# Patient Record
Sex: FEMALE | Race: OTHER | Employment: FULL TIME | URBAN - METROPOLITAN AREA
[De-identification: names, ages, dates, MRNs, and addresses within clinical notes are randomized per-mention and may not be internally consistent; named-entity substitution may affect disease eponyms.]

---

## 2018-01-24 ENCOUNTER — OFFICE VISIT (OUTPATIENT)
Dept: OBGYN CLINIC | Facility: CLINIC | Age: 28
End: 2018-01-24

## 2018-01-24 VITALS
BODY MASS INDEX: 31.81 KG/M2 | DIASTOLIC BLOOD PRESSURE: 70 MMHG | HEIGHT: 62 IN | WEIGHT: 172.88 LBS | SYSTOLIC BLOOD PRESSURE: 116 MMHG

## 2018-01-24 DIAGNOSIS — Z32.02 PREGNANCY EXAMINATION OR TEST, NEGATIVE RESULT: Primary | ICD-10-CM

## 2018-01-24 DIAGNOSIS — Z12.4 ROUTINE CERVICAL SMEAR: ICD-10-CM

## 2018-01-24 DIAGNOSIS — N92.6 IRREGULAR PERIODS: ICD-10-CM

## 2018-01-24 LAB
CONTROL LINE PRESENT WITH A CLEAR BACKGROUND (YES/NO): YES YES/NO
PREGNANCY TEST, URINE: NEGATIVE

## 2018-01-24 PROCEDURE — 87591 N.GONORRHOEAE DNA AMP PROB: CPT | Performed by: OBSTETRICS & GYNECOLOGY

## 2018-01-24 PROCEDURE — 87491 CHLMYD TRACH DNA AMP PROBE: CPT | Performed by: OBSTETRICS & GYNECOLOGY

## 2018-01-24 PROCEDURE — 88175 CYTOPATH C/V AUTO FLUID REDO: CPT | Performed by: OBSTETRICS & GYNECOLOGY

## 2018-01-24 PROCEDURE — 81025 URINE PREGNANCY TEST: CPT | Performed by: OBSTETRICS & GYNECOLOGY

## 2018-01-24 PROCEDURE — 99385 PREV VISIT NEW AGE 18-39: CPT | Performed by: OBSTETRICS & GYNECOLOGY

## 2018-01-25 ENCOUNTER — APPOINTMENT (OUTPATIENT)
Dept: LAB | Facility: REFERENCE LAB | Age: 28
End: 2018-01-25
Attending: OBSTETRICS & GYNECOLOGY
Payer: COMMERCIAL

## 2018-01-25 DIAGNOSIS — N92.6 IRREGULAR PERIODS: ICD-10-CM

## 2018-01-25 LAB
C TRACH DNA SPEC QL NAA+PROBE: NEGATIVE
ESTRADIOL SERPL-MCNC: 60 PG/ML
FSH SERPL-ACNC: 7.7 MIU/ML
LH SERPL-ACNC: 16.2 MIU/ML
N GONORRHOEA DNA SPEC QL NAA+PROBE: NEGATIVE
PROGEST SERPL-MCNC: 0.4 NG/ML
PROLACTIN SERPL-MCNC: 12.1 NG/ML (ref 1.4–24.2)
TSH SERPL-ACNC: 2.19 UIU/ML (ref 0.45–5.33)

## 2018-01-25 PROCEDURE — 84443 ASSAY THYROID STIM HORMONE: CPT | Performed by: OBSTETRICS & GYNECOLOGY

## 2018-01-25 PROCEDURE — 83001 ASSAY OF GONADOTROPIN (FSH): CPT | Performed by: OBSTETRICS & GYNECOLOGY

## 2018-01-25 PROCEDURE — 83002 ASSAY OF GONADOTROPIN (LH): CPT | Performed by: OBSTETRICS & GYNECOLOGY

## 2018-01-25 PROCEDURE — 84144 ASSAY OF PROGESTERONE: CPT | Performed by: OBSTETRICS & GYNECOLOGY

## 2018-01-25 PROCEDURE — 36415 COLL VENOUS BLD VENIPUNCTURE: CPT | Performed by: OBSTETRICS & GYNECOLOGY

## 2018-01-25 PROCEDURE — 84402 ASSAY OF FREE TESTOSTERONE: CPT | Performed by: OBSTETRICS & GYNECOLOGY

## 2018-01-25 PROCEDURE — 82670 ASSAY OF TOTAL ESTRADIOL: CPT | Performed by: OBSTETRICS & GYNECOLOGY

## 2018-01-25 PROCEDURE — 84403 ASSAY OF TOTAL TESTOSTERONE: CPT | Performed by: OBSTETRICS & GYNECOLOGY

## 2018-01-25 NOTE — PROGRESS NOTES
[address] [place] [address]    2018  7:41 PM    CC:Patient presents to establish care    HPI: Patient is a 24year old  LMP 2017 after discontinuation of long-term Nuvaring here for annual gyn exam. Cycles always irregular  before Nuvaring placed age pain, diarrhea, constipation  : no complaints, denies dysuria, increased urinary frequency  Hematological/lymphatic: no complaints  Breast: denies rashes, skin changes, pain, lumps or discharge   Psychiatric: no complaints  Endocrine:no complaints  Neuro KENRICK          1/24/2018  Monet Cazares MD

## 2018-01-28 LAB
TESTOSTERONE, FREE, S: 1.14 NG/DL
TESTOSTERONE, TOTAL, S: 52 NG/DL

## 2018-01-29 ENCOUNTER — ULTRASOUND ENCOUNTER (OUTPATIENT)
Dept: OBGYN CLINIC | Facility: CLINIC | Age: 28
End: 2018-01-29

## 2018-01-29 ENCOUNTER — OFFICE VISIT (OUTPATIENT)
Dept: OBGYN CLINIC | Facility: CLINIC | Age: 28
End: 2018-01-29

## 2018-01-29 VITALS — BODY MASS INDEX: 31.65 KG/M2 | HEIGHT: 62 IN | WEIGHT: 172 LBS

## 2018-01-29 DIAGNOSIS — E28.2 PCO (POLYCYSTIC OVARIES): Primary | ICD-10-CM

## 2018-01-29 DIAGNOSIS — N92.6 IRREGULAR PERIODS: ICD-10-CM

## 2018-01-29 PROCEDURE — 76856 US EXAM PELVIC COMPLETE: CPT | Performed by: OBSTETRICS & GYNECOLOGY

## 2018-01-29 PROCEDURE — 76830 TRANSVAGINAL US NON-OB: CPT | Performed by: OBSTETRICS & GYNECOLOGY

## 2018-01-29 PROCEDURE — 99213 OFFICE O/P EST LOW 20 MIN: CPT | Performed by: OBSTETRICS & GYNECOLOGY

## 2018-01-29 NOTE — PROGRESS NOTES
Miriam Craft is a 24year old female. HPI:   Patient presents with: Follow - Up: follow up from usn and blood test    Results reviewed = LH>FSH ratio and multifolllicular ovaries consistent with PCO. Desires fertility in next few years.  Discussed ov

## 2018-12-31 RX ORDER — NORGESTREL AND ETHINYL ESTRADIOL 0.3-0.03MG
KIT ORAL
Qty: 28 TABLET | Refills: 0 | Status: SHIPPED | OUTPATIENT
Start: 2018-12-31 | End: 2019-01-27

## 2018-12-31 NOTE — TELEPHONE ENCOUNTER
Last seen 01/29/2018. 28 day refill sent to pharmacy along with message to pharmacist to have the pt call the office to schedule annual exam appt.

## 2019-01-28 RX ORDER — NORGESTREL AND ETHINYL ESTRADIOL 0.3-0.03MG
KIT ORAL
Qty: 28 TABLET | Refills: 0 | Status: SHIPPED | OUTPATIENT
Start: 2019-01-28 | End: 2019-03-06

## 2019-03-06 ENCOUNTER — OFFICE VISIT (OUTPATIENT)
Dept: OBGYN CLINIC | Facility: CLINIC | Age: 29
End: 2019-03-06
Payer: COMMERCIAL

## 2019-03-06 VITALS
BODY MASS INDEX: 30.49 KG/M2 | SYSTOLIC BLOOD PRESSURE: 110 MMHG | WEIGHT: 165.69 LBS | HEIGHT: 62 IN | DIASTOLIC BLOOD PRESSURE: 74 MMHG

## 2019-03-06 DIAGNOSIS — Z30.41 ENCOUNTER FOR SURVEILLANCE OF CONTRACEPTIVE PILLS: ICD-10-CM

## 2019-03-06 DIAGNOSIS — Z01.419 WOMEN'S ANNUAL ROUTINE GYNECOLOGICAL EXAMINATION: Primary | ICD-10-CM

## 2019-03-06 PROCEDURE — 99395 PREV VISIT EST AGE 18-39: CPT | Performed by: OBSTETRICS & GYNECOLOGY

## 2019-03-06 NOTE — PROGRESS NOTES
GYN ANNUAL    3/6/2019  12:12 PM    CC:Patient presents for well woman visit    HPI: Patient is a 34year old  LMP 3/1/2019 here for annual gyn exam and OCP refill. Cycles well-controlled on OCPs. No pelvic pain.  No abnormal vaginal discharge or ble dysuria, increased urinary frequency  Hematological/lymphatic: no complaints  Breast: denies rashes, skin changes, pain, lumps or discharge   Psychiatric: no complaints  Endocrine:no complaints  Neurological: no complaints  Immunological: no complaints  Mu

## 2019-04-10 ENCOUNTER — PATIENT MESSAGE (OUTPATIENT)
Dept: OBGYN CLINIC | Facility: CLINIC | Age: 29
End: 2019-04-10

## 2019-04-10 NOTE — TELEPHONE ENCOUNTER
From: Pauline Richardson  To: Claudio Saavedra MD  Sent: 4/10/2019 11:20 AM CDT  Subject: Non-Urgent Medical Question    Hello,     I've noticed my urine has a strong odor and an increase in my vaginal discharge for the past two weeks.  I had to switch birth

## 2019-04-10 NOTE — PROGRESS NOTES
Pt states she was off her OCP's for a month and just started back on them. LMP was end of March and pt noticed a clear liquid discharge, no odor, no tching, no burning for the last 2 weeks. Pt states her urine is very dark and has a stronger odor.  This

## 2020-02-07 RX ORDER — NORGESTREL AND ETHINYL ESTRADIOL 0.3-0.03MG
KIT ORAL
Qty: 84 TABLET | Refills: 0 | Status: SHIPPED | OUTPATIENT
Start: 2020-02-07 | End: 2020-05-01

## 2020-05-01 RX ORDER — NORGESTREL AND ETHINYL ESTRADIOL 0.3-0.03MG
KIT ORAL
Qty: 84 TABLET | Refills: 1 | Status: SHIPPED | OUTPATIENT
Start: 2020-05-01 | End: 2020-11-04

## 2020-05-01 NOTE — TELEPHONE ENCOUNTER
A refill request was received for:  Requested Prescriptions     Pending Prescriptions Disp Refills   • LOW-OGESTREL 0.3-30 MG-MCG Oral Tab [Pharmacy Med Name: NNORGEST/E ES(LOWOGESTREL)PK28 0.3/30] 84 tablet 3     Sig: TAKE 1 TABLET DAILY [facility]

## 2020-10-16 RX ORDER — NORGESTREL AND ETHINYL ESTRADIOL 0.3-0.03MG
KIT ORAL
Qty: 84 TABLET | Refills: 3 | OUTPATIENT
Start: 2020-10-16

## 2020-10-16 NOTE — TELEPHONE ENCOUNTER
A refill request was received for:  Requested Prescriptions     Pending Prescriptions Disp Refills   • LOW-OGESTREL 0.3-30 MG-MCG Oral Tab [Pharmacy Med Name: NORGEST/ETH ES(LOW-OGESTREL)TAB 28S 0.3MG/30MCG] 84 tablet 3     Sig: TAKE 1 TABLET DAILY (TIME F

## 2020-11-04 ENCOUNTER — OFFICE VISIT (OUTPATIENT)
Dept: OBGYN CLINIC | Facility: CLINIC | Age: 30
End: 2020-11-04
Payer: COMMERCIAL

## 2020-11-04 VITALS
BODY MASS INDEX: 33.33 KG/M2 | WEIGHT: 181.13 LBS | DIASTOLIC BLOOD PRESSURE: 70 MMHG | HEIGHT: 62 IN | SYSTOLIC BLOOD PRESSURE: 106 MMHG

## 2020-11-04 DIAGNOSIS — Z12.4 ROUTINE CERVICAL SMEAR: ICD-10-CM

## 2020-11-04 DIAGNOSIS — Z01.419 WOMEN'S ANNUAL ROUTINE GYNECOLOGICAL EXAMINATION: Primary | ICD-10-CM

## 2020-11-04 DIAGNOSIS — Z30.41 ENCOUNTER FOR SURVEILLANCE OF CONTRACEPTIVE PILLS: ICD-10-CM

## 2020-11-04 PROCEDURE — 3074F SYST BP LT 130 MM HG: CPT | Performed by: OBSTETRICS & GYNECOLOGY

## 2020-11-04 PROCEDURE — 3078F DIAST BP <80 MM HG: CPT | Performed by: OBSTETRICS & GYNECOLOGY

## 2020-11-04 PROCEDURE — 3008F BODY MASS INDEX DOCD: CPT | Performed by: OBSTETRICS & GYNECOLOGY

## 2020-11-04 PROCEDURE — 99072 ADDL SUPL MATRL&STAF TM PHE: CPT | Performed by: OBSTETRICS & GYNECOLOGY

## 2020-11-04 PROCEDURE — 88175 CYTOPATH C/V AUTO FLUID REDO: CPT | Performed by: OBSTETRICS & GYNECOLOGY

## 2020-11-04 PROCEDURE — 99395 PREV VISIT EST AGE 18-39: CPT | Performed by: OBSTETRICS & GYNECOLOGY

## 2020-11-04 RX ORDER — NORGESTREL AND ETHINYL ESTRADIOL 0.3-0.03MG
1 KIT ORAL DAILY
Qty: 84 TABLET | Refills: 3 | Status: SHIPPED | OUTPATIENT
Start: 2020-11-04 | End: 2021-11-16

## 2020-11-05 NOTE — PROGRESS NOTES
GYN ANNUAL    2020  6:03 PM    Patient presents with: Annual: Annual exam and pap smear   Refill Request: LOW-OGESTREL 0.3-30 MG-MCG Oral   .    HPI: Patient is a 34year old  LMP 10/9/20 presents for annual gyn exam and OCP refill.  Cycles wel complaints  Cardiovascular: no complaints  GI: denies abdominal pain, diarrhea, constipation  : no complaints, denies dysuria, increased urinary frequency  Hematological/lymphatic: no complaints  Breast: denies rashes, skin changes, pain, lumps or discha

## 2021-04-09 DIAGNOSIS — Z23 NEED FOR VACCINATION: ICD-10-CM

## 2021-10-12 NOTE — TELEPHONE ENCOUNTER
[facility]  MED CENTER regarding making an appointment for annual physical

## 2021-11-16 ENCOUNTER — OFFICE VISIT (OUTPATIENT)
Dept: OBGYN CLINIC | Facility: CLINIC | Age: 31
End: 2021-11-16
Payer: COMMERCIAL

## 2021-11-16 VITALS
DIASTOLIC BLOOD PRESSURE: 82 MMHG | WEIGHT: 183.31 LBS | SYSTOLIC BLOOD PRESSURE: 110 MMHG | HEIGHT: 62 IN | BODY MASS INDEX: 33.73 KG/M2

## 2021-11-16 DIAGNOSIS — Z01.419 WOMEN'S ANNUAL ROUTINE GYNECOLOGICAL EXAMINATION: Primary | ICD-10-CM

## 2021-11-16 DIAGNOSIS — Z30.41 ENCOUNTER FOR SURVEILLANCE OF CONTRACEPTIVE PILLS: ICD-10-CM

## 2021-11-16 PROCEDURE — 3008F BODY MASS INDEX DOCD: CPT | Performed by: OBSTETRICS & GYNECOLOGY

## 2021-11-16 PROCEDURE — 99395 PREV VISIT EST AGE 18-39: CPT | Performed by: OBSTETRICS & GYNECOLOGY

## 2021-11-16 PROCEDURE — 3074F SYST BP LT 130 MM HG: CPT | Performed by: OBSTETRICS & GYNECOLOGY

## 2021-11-16 PROCEDURE — 3079F DIAST BP 80-89 MM HG: CPT | Performed by: OBSTETRICS & GYNECOLOGY

## 2021-11-16 PROCEDURE — 87591 N.GONORRHOEAE DNA AMP PROB: CPT | Performed by: OBSTETRICS & GYNECOLOGY

## 2021-11-16 PROCEDURE — 87491 CHLMYD TRACH DNA AMP PROBE: CPT | Performed by: OBSTETRICS & GYNECOLOGY

## 2021-11-16 RX ORDER — NORGESTREL AND ETHINYL ESTRADIOL 0.3-0.03MG
1 KIT ORAL DAILY
Qty: 84 TABLET | Refills: 3 | Status: SHIPPED | OUTPATIENT
Start: 2021-11-16

## 2021-11-16 NOTE — PROGRESS NOTES
GYN ANNUAL    2021  3:29 PM    Patient presents with: Annual: Annual Gyne Exam  .    HPI: Patient is a 31year old  LMP 10/20/21 presents for annual gyn exam - due for PAP and OCP refill, requesting STD screen. Cycles well controlled.  Reports developed, well nourished, in no apparent distress  SKIN: no rashes, no lesions  HEENT: normal  LUNGS: respiration unlabored  CARDIOVASCULAR: no peripheral edema or varicosities, skin warm and dry  BREASTS: bilaterally nontender, no palpable masses, no nip

## 2021-12-07 RX ORDER — NORGESTREL AND ETHINYL ESTRADIOL 0.3-0.03MG
KIT ORAL
Qty: 84 TABLET | Refills: 0 | OUTPATIENT
Start: 2021-12-07

## 2022-09-30 RX ORDER — NORGESTREL AND ETHINYL ESTRADIOL 0.3-0.03MG
1 KIT ORAL DAILY
Qty: 84 TABLET | Refills: 0 | Status: SHIPPED | OUTPATIENT
Start: 2022-09-30

## 2024-11-20 ENCOUNTER — INITIAL PRENATAL (OUTPATIENT)
Dept: OBGYN CLINIC | Facility: CLINIC | Age: 34
End: 2024-11-20

## 2024-11-20 VITALS
BODY MASS INDEX: 33.3 KG/M2 | DIASTOLIC BLOOD PRESSURE: 66 MMHG | WEIGHT: 176.38 LBS | SYSTOLIC BLOOD PRESSURE: 114 MMHG | HEIGHT: 61 IN

## 2024-11-20 DIAGNOSIS — Z32.01 PREGNANCY EXAMINATION OR TEST, POSITIVE RESULT (HCC): Primary | ICD-10-CM

## 2024-11-20 DIAGNOSIS — N91.1 SECONDARY AMENORRHEA: ICD-10-CM

## 2024-11-20 PROCEDURE — 99204 OFFICE O/P NEW MOD 45 MIN: CPT | Performed by: OBSTETRICS & GYNECOLOGY

## 2024-11-20 PROCEDURE — 76801 OB US < 14 WKS SINGLE FETUS: CPT | Performed by: OBSTETRICS & GYNECOLOGY

## 2024-11-20 NOTE — H&P
[facility]  Obstetrics and Gynecology    Subjective:     Alisson Gonzalez is a 33 year old  female presents with c/o secondary amenorrhea and positive pregnancy test. The patient was recommended to return for further evaluation. The patient reports normal monthly menses leading up to last menstrual period. Has irregular cramping without bleeding. No nausea or vomiting. Patient's last menstrual period was 2024 (exact date)..     Review of Systems:  General: no complaints  Eyes: no complaints  Respiratory: no complaints  Cardiovascular: no complaints  GI: no complaints  : See HPI  Hematological/lymphatic: no complaints  Breast: no complaints  Psychiatric: no complaints  Endocrine:no complaints  Neurological: no complaints  Immunological: no complaints  Musculoskeletal:no complaints    Past Medical History:    Patient denies medical problems    as of 2018       Past Surgical History:   Procedure Laterality Date    Patient denies any surgical history      as of 2019       Social History     Socioeconomic History    Marital status: Single     Spouse name: Not on file    Number of children: Not on file    Years of education: Not on file    Highest education level: Not on file   Occupational History    Not on file   Tobacco Use    Smoking status: Never    Smokeless tobacco: Never   Substance and Sexual Activity    Alcohol use: Yes    Drug use: No    Sexual activity: Yes     Partners: Male   Other Topics Concern     Service Not Asked    Blood Transfusions No    Caffeine Concern Not Asked    Occupational Exposure Not Asked    Hobby Hazards Not Asked    Sleep Concern Not Asked    Stress Concern Not Asked    Weight Concern Not Asked    Special Diet Not Asked    Back Care Not Asked    Exercise Not Asked    Bike Helmet Not Asked    Seat Belt Not Asked    Self-Exams Not Asked   Social History Narrative    No h/o abuse      Social Drivers of Health     Financial Resource  Strain: Not on file   Food Insecurity: Not on file   Transportation Needs: Not on file   Stress: Not on file (2021)   Housing Stability: Low Risk  (2021)    Received from [facility]    Housing Stability     Mortgage Payment Concerns?: Not on file     Number of Places Lived in the Last Year: Not on file     Unstable Housing?: Not on file       Family History   Problem Relation Age of Onset    No Known Problems Maternal Grandmother     No Known Problems Maternal Grandfather     No Known Problems Paternal Grandmother     No Known Problems Paternal Grandfather        Feels safe at home       Objective:     Vitals:    24 1456   BP: 114/66   Weight: 176 lb 6.4 oz (80 kg)   Height: 61\"         Body mass index is 33.33 kg/m².    Exam:   GENERAL: well developed, well nourished, in no apparent distress, alert and orientated X 3  PSYCH: mood and affect stable   SKIN: no rashes, no lesions  LUNGS: respiration unlabored  CARDIOVASCULAR: no peripheral edema or varicosities, skin warm and dry  ABDOMEN: Soft, non distended; non tender.   EXTREMITIES:  Normal range of motion, strength 5/5 walking, nontender without edema    Imaging:  Pelvic ultrasound 24:   Findings:   Crown-rump length measures 39.3 mm, 10 weeks 6 days.   Fetal heart rate measures 157 beats per minute via doppler-mode.        Assessment:     Alisson Gonzalez is a 33 year old  female who presents for secondary amenorrhea and positive pregnancy test    Patient Active Problem List   Diagnosis    Pregnancy examination or test, negative result    Irregular periods    PCO (polycystic ovaries)    Women's annual routine gynecological examination    Encounter for surveillance of contraceptive pills         Plan:     Secondary amenorrhea  - a/w positive pregnancy test  - US noted for viable IUP at 10w6d consistent with last menstrual period CHITO. Final CHITO is Estimated Date of Delivery: 25.    - RN education visit ASAP.   -  New OB visit in 3-4 weeks.     Patient counseling via instructions   - Pt counseled on safety, diet, exercise, caffiene, tobacco, ETOH, sexual activity, ER precautions, diet, exercise, appropriate otc medication use, substance abuse   - Counseled on taking a PNV with 0.4mg of folic acid    - advised to follow up to establish prenatal care   - SAB precautions reviewed.    - Nausea and vomiting in pregnancy including vitamin B 6 and unisom reviewed in after visit instructions.     All of the findings and plan were discussed with the patient.  She notes understanding and agrees with the plan of care.  All questions were answered to the best of my ability at this time.    RTC as above or sooner if needed     Dr. Ap Kowalski MD    [facility]     This note was created by [employer] voice recognition. Errors in content may be related to improper recognition by the system; efforts to review and correct have been done but errors may still exist. Please be advised the primary purpose of this note is for me to communicate medical care. Standard sentence structure is not always used. Medical terminology and medical abbreviations may be used. There may be grammatical, typographical, and automated fill ins that may have errors missed in proofreading.      Total patient time was 45 minutes in evaluation and management.

## 2024-11-20 NOTE — PATIENT INSTRUCTIONS
Adapting to Pregnancy: First Trimester    As your body adjusts, you may have to change or limit your daily activities. You’ll need more rest. You may also need to use the energy you have more wisely.  Your changing body  Almost every part of your body is affected as you adapt to pregnancy. The uterus and cervix will begin to soften right away. You may not look very pregnant during the first 3 months. But you are likely to have some common signs of early pregnancy:  Nausea  Fatigue  Frequent urination  Mood swings  Bloating of the belly  Constipation  Heartburn  Missed or light periods (first trimester bleeding)  Nipple or breast tenderness and breast swelling  It’s not too late to start good habits  What matters most is protecting your baby from this moment on. If you smoke, drink alcohol, or use drugs, now is the time to stop. If you need help, talk with your healthcare provider:  Smoking increases the risk of stillbirth or having a low-birth-weight baby. If you smoke, quit now.  Alcohol and drugs have been linked with miscarriage, birth defects, intellectual disability, and low birth weight. Do not drink alcohol or take drugs.  Tips to relieve nausea  Although nausea can happen at any time of the day, it may be worse in the morning. To help prevent nausea:  Eat small, light meals at frequent intervals.  Drink fluids often.  Get up slowly. Eat a few unsalted crackers before you get out of bed.  Avoid smells that bother you.  Avoid spicy and fatty foods.  Eat an ice pop in your favorite flavor.  Get plenty of rest.  You can start taking edwardo or mint in all forms or vitamin B6 (maximum 200 mg throughout the day) and Unisom (12.5-25 mg) nightly for nausea and/or vomiting.  Talk with your healthcare provider if you take vitamins that upset your stomach.    Work concerns  The end of the first trimester is a good time to discuss working during pregnancy with your employer. Follow your healthcare provider’s advice if  your job needs you to stand for a long time, work with hazardous tools, or even sit at a desk all day. Your workspace, workload, or scheduled hours may need to be adjusted. Perhaps you can change body postures more often or take an extra break. It is also acceptable to work throughout your pregnancy until delivery.   Advice for travel  Talk to your healthcare provider first, but the second trimester may be the best time for any travel. You may be advised to avoid certain trips while you’re pregnant. Food and water can be concerns in developing countries. Travel by car is a good choice, as you can stop, get out, and stretch. Bring snacks and water along. Fasten the lap belt below your belly, low over your hips. Also be sure to wear the shoulder harness.  Intimacy  Unless your healthcare provider tells you to, there is no reason to stop having sex while you’re pregnant. You or your partner may notice changes in desire. Desire may be less in the first trimester, due to nausea and fatigue. In the second trimester, sex may be very enjoyable. The third trimester can be a challenge comfort-wise. Try different positions and see what’s best for you both.  [employer] last reviewed this educational content on 10/1/2017  © 0039-5053 [employer]. [address] [facility], [place] [address]. All rights reserved. This information is not intended as a substitute for professional medical care. Always follow your healthcare professional's instructions.

## 2024-11-21 ENCOUNTER — MED REC SCAN ONLY (OUTPATIENT)
Dept: OBGYN CLINIC | Facility: CLINIC | Age: 34
End: 2024-11-21

## 2024-12-05 ENCOUNTER — LAB ENCOUNTER (OUTPATIENT)
Dept: LAB | Age: 34
End: 2024-12-05
Attending: OBSTETRICS & GYNECOLOGY
Payer: MEDICAID

## 2024-12-05 ENCOUNTER — NURSE ONLY (OUTPATIENT)
Dept: OBGYN CLINIC | Facility: CLINIC | Age: 34
End: 2024-12-05
Payer: MEDICAID

## 2024-12-05 DIAGNOSIS — Z34.81 ENCOUNTER FOR SUPERVISION OF OTHER NORMAL PREGNANCY IN FIRST TRIMESTER (HCC): ICD-10-CM

## 2024-12-05 DIAGNOSIS — Z34.81 ENCOUNTER FOR SUPERVISION OF OTHER NORMAL PREGNANCY IN FIRST TRIMESTER (HCC): Primary | ICD-10-CM

## 2024-12-05 LAB
ANTIBODY SCREEN: NEGATIVE
BASOPHILS # BLD AUTO: 0.02 X10(3) UL (ref 0–0.2)
BASOPHILS NFR BLD AUTO: 0.2 %
DEPRECATED RDW RBC AUTO: 43.5 FL (ref 35.1–46.3)
EOSINOPHIL # BLD AUTO: 0.12 X10(3) UL (ref 0–0.7)
EOSINOPHIL NFR BLD AUTO: 1.5 %
ERYTHROCYTE [DISTWIDTH] IN BLOOD BY AUTOMATED COUNT: 13.2 % (ref 11–15)
EST. AVERAGE GLUCOSE BLD GHB EST-MCNC: 94 MG/DL (ref 68–126)
GLUCOSE 1H P GLC SERPL-MCNC: 74 MG/DL
HBA1C MFR BLD: 4.9 % (ref ?–5.7)
HBV SURFACE AG SER-ACNC: <0.1 [IU]/L
HBV SURFACE AG SERPL QL IA: NONREACTIVE
HCT VFR BLD AUTO: 33.4 %
HCV AB SERPL QL IA: NONREACTIVE
HGB BLD-MCNC: 12.1 G/DL
IMM GRANULOCYTES # BLD AUTO: 0.03 X10(3) UL (ref 0–1)
IMM GRANULOCYTES NFR BLD: 0.4 %
LYMPHOCYTES # BLD AUTO: 2.05 X10(3) UL (ref 1–4)
LYMPHOCYTES NFR BLD AUTO: 25.6 %
MCH RBC QN AUTO: 32.9 PG (ref 26–34)
MCHC RBC AUTO-ENTMCNC: 36.2 G/DL (ref 31–37)
MCV RBC AUTO: 90.8 FL
MONOCYTES # BLD AUTO: 0.63 X10(3) UL (ref 0.1–1)
MONOCYTES NFR BLD AUTO: 7.9 %
NEUTROPHILS # BLD AUTO: 5.16 X10 (3) UL (ref 1.5–7.7)
NEUTROPHILS # BLD AUTO: 5.16 X10(3) UL (ref 1.5–7.7)
NEUTROPHILS NFR BLD AUTO: 64.4 %
PLATELET # BLD AUTO: 318 10(3)UL (ref 150–450)
RBC # BLD AUTO: 3.68 X10(6)UL
RH BLOOD TYPE: POSITIVE
RUBV IGG SER QL: NEGATIVE
RUBV IGG SER-ACNC: 3 IU/ML (ref 10–?)
T PALLIDUM AB SER QL IA: NONREACTIVE
WBC # BLD AUTO: 8 X10(3) UL (ref 4–11)

## 2024-12-05 PROCEDURE — 86780 TREPONEMA PALLIDUM: CPT

## 2024-12-05 PROCEDURE — 85025 COMPLETE CBC W/AUTO DIFF WBC: CPT

## 2024-12-05 PROCEDURE — 87086 URINE CULTURE/COLONY COUNT: CPT

## 2024-12-05 PROCEDURE — 86762 RUBELLA ANTIBODY: CPT

## 2024-12-05 PROCEDURE — 86901 BLOOD TYPING SEROLOGIC RH(D): CPT

## 2024-12-05 PROCEDURE — 83021 HEMOGLOBIN CHROMOTOGRAPHY: CPT

## 2024-12-05 PROCEDURE — 86850 RBC ANTIBODY SCREEN: CPT

## 2024-12-05 PROCEDURE — 82950 GLUCOSE TEST: CPT

## 2024-12-05 PROCEDURE — 36415 COLL VENOUS BLD VENIPUNCTURE: CPT

## 2024-12-05 PROCEDURE — 83036 HEMOGLOBIN GLYCOSYLATED A1C: CPT

## 2024-12-05 PROCEDURE — 86803 HEPATITIS C AB TEST: CPT

## 2024-12-05 PROCEDURE — 86900 BLOOD TYPING SEROLOGIC ABO: CPT

## 2024-12-05 PROCEDURE — 87389 HIV-1 AG W/HIV-1&-2 AB AG IA: CPT

## 2024-12-05 PROCEDURE — 87340 HEPATITIS B SURFACE AG IA: CPT

## 2024-12-05 PROCEDURE — 83020 HEMOGLOBIN ELECTROPHORESIS: CPT

## 2024-12-05 RX ORDER — MULTIVIT,IRON,MINERALS/LUTEIN
TABLET ORAL
COMMUNITY

## 2024-12-05 NOTE — PROGRESS NOTES
Pt is a   here today for RN OB Education.       Pregnancy Confirmation apt with:  with BAYRON    LMP:     US:  (10w6d)    Working CHITO: 25    Pre  BMI: 33.08      Medical Hx significant for: Denies    Obstetrical Hx significant for: EAB ()    Surgical Hx significant for: Denies    EPDS score: 0    Early GTT screening: meets criteria, GTT ordered.     Preeclampsia prevention screening: does not meet criteria.     OUD Screening: Patient has answered NO to 5p questions and has no  risk factors.     Patient given \"What Pregnant Women Need to Know\" handout.     Educational material reviewed with patient: Prenatal care, nutrition, weight gain recommendations, travel, exercise, intercourse, pregnancy changes, safe medications, pregnancy and work, fetal movement, labor and  labor, warning signs, food safety, tdap, cord blood, breastfeeding, circumcision, and Group B strep.     Pt agrees to blood transfusion if needed: Yes    PN labs ordered: Yes     Optional genetic screening discussed.  Pt desires Prequel and Foresight.     [facility] Media Policy: Reviewed and verbalized understanding.     NOB appt: 12/10 with BAYRON    Lab appt:

## 2024-12-10 ENCOUNTER — INITIAL PRENATAL (OUTPATIENT)
Dept: OBGYN CLINIC | Facility: CLINIC | Age: 34
End: 2024-12-10
Payer: MEDICAID

## 2024-12-10 VITALS
WEIGHT: 177.38 LBS | DIASTOLIC BLOOD PRESSURE: 58 MMHG | HEIGHT: 61 IN | BODY MASS INDEX: 33.49 KG/M2 | SYSTOLIC BLOOD PRESSURE: 102 MMHG

## 2024-12-10 DIAGNOSIS — Z34.82 ENCOUNTER FOR SUPERVISION OF OTHER NORMAL PREGNANCY IN SECOND TRIMESTER (HCC): Primary | ICD-10-CM

## 2024-12-10 PROCEDURE — 0500F INITIAL PRENATAL CARE VISIT: CPT | Performed by: OBSTETRICS & GYNECOLOGY

## 2024-12-11 LAB
HGB A2 MFR BLD: 2.5 % (ref 1.5–3.5)
HGB PNL BLD ELPH: 97.5 % (ref 95.5–100)

## 2024-12-11 NOTE — PROGRESS NOTES
[facility]  Obstetrics and Gynecology  History & Physical    CC: Patient is here to establish prenatal care     Subjective:     HPI:  Alisson Gonzalez is a 33 year old  female at 14w2d who presents today to establish prenatal care. Patient reports no complaints. Denies vaginal bleeding, abdominal/pelvic pain, nausea and vomiting.     LMP: Patient's last menstrual period was 2024 (exact date).  CHITO:  Estimated Date of Delivery: 25    OB:  OB History    Para Term  AB Living   2       1 0   SAB IAB Ectopic Multiple Live Births                  # Outcome Date GA Lbr Alphonse/2nd Weight Sex Type Anes PTL Lv   2 Current            1 AB 2011 6w0d    THERAPEUTIC         Birth Comments: eab       GYN:   Pap hx- Last Pap 21: NILM, HPV n/a     PMH:   Past Medical History:    Patient denies medical problems    as of 2018       PSH:    Past Surgical History:   Procedure Laterality Date    Patient denies any surgical history      as of 2019       MEDS:  Medications Ordered Prior to Encounter[1]    Allergies:    Allergies[2]    Immunizations:    There is no immunization history on file for this patient.    Family Hx:      Family History   Problem Relation Age of Onset    Diabetes Father     No Known Problems Mother     No Known Problems Maternal Grandmother     Diabetes Maternal Grandfather     No Known Problems Paternal Grandmother     Diabetes Paternal Grandfather     Other (Other) Sister         Deaf       SocialHx:        Social History     Socioeconomic History    Marital status: Single   Tobacco Use    Smoking status: Never    Smokeless tobacco: Never   Substance and Sexual Activity    Alcohol use: Not Currently    Drug use: No    Sexual activity: Yes     Partners: Male   Other Topics Concern    Blood Transfusions No   Social History Narrative    No h/o abuse      Social Drivers of Health     Financial Resource Strain: High Risk (2024)    Financial Resource  Strain     Difficulty of Paying Living Expenses: Hard     Med Affordability: Yes   Food Insecurity: No Food Insecurity (2024)    Food Insecurity     Food Insecurity: Never true   Transportation Needs: No Transportation Needs (2024)    Transportation Needs     Lack of Transportation: No   Stress: No Stress Concern Present (2024)    Stress     Feeling of Stress : No   Housing Stability: Low Risk  (2024)    Housing Stability     Housing Instability: No     Review of Systems:  General: no complaints  Eyes: no complaints  Respiratory: no complaints  Cardiovascular: no complaints  GI: no complaints  : See HPI  Hematological/lymphatic: no complaints  Breast: no complaints  Psychiatric: no complaints  Endocrine:no complaints  Neurological: no complaints  Immunological: no complaints  Musculoskeletal:no complaints    Objective:     Vitals:    12/10/24 1553   BP: 102/58   Weight: 177 lb 6.4 oz (80.5 kg)   Height: 61\"        Exam:   GENERAL: well developed, well nourished, in no apparent distress, alert and orientated X 3  PSYCH: mood and affect stable   SKIN: no rashes, no lesions  LUNGS: respiration unlabored  CARDIOVASCULAR: no peripheral edema or varicosities, skin warm and dry  ABDOMEN: Soft, non distended; non tender, no masses  EXTREMITIES:  Normal range of motion, strength 5/5 walking, nontender without edema    Fetal Well Being Assessment:    TAUS: CRL grossly consistent with established CHITO. Doppler Mode 's. Ritter, live IUP.     Assessment/Plan:     Alisson Gonzalez is a 33 year old  female at 14w2d who presents today to establish prenatal care.    Patient Active Problem List   Diagnosis    Pregnancy examination or test, negative result    Irregular periods    PCO (polycystic ovaries)    Women's annual routine gynecological examination    Encounter for surveillance of contraceptive pills         Prenatal care  - prenatal labs ordered  - Pap: due. Will need at follow up.   -  continue PNV daily  - MSAFP ordered for 15 weeks.   - ELSI in 3-4 weeks or in 6 weeks after anatomy ultrasound.   - Anatomy ultrasound in 6 weeks.     Genetic Screening tests  - Discussion held with patient about genetic screening: Cell free DNA and amniocentesis.  - Patient offered Amniocentesis and declined.   - Pt desires cell free DNA. Ordered and collected previously.  - Myriad Carrier screening including Cystic fibrosis, SMA, and hemoglobinopathies: offered and patient agreeable, ordered and collected previously.       Patient education  - Pt counseled on safety, diet, exercise, caffiene, tobacco, ETOH, sexual activity, ER precautions, diet, exercise, appropriate weight gain, travel, appropriate otc medication use, substance abuse and pets.  - Counseled on taking a PNV with 0.4mg of folic acid   - Limiting intake of alcohol, coffee, tea, soda, and other foods containing caffeine  - Limit intake of fish to twice weekly to limit mercury exposure  - Pregnancy BMI guidelines: Prepregnancy weight: 175 lbs. Weight today 177 lbs. BMI 33. Expected Weigh gain 11-20 lbs. TWG +2 lbs.     RTC in 3-4 weeks     Dr. Ap Kowalski MD    [facility]     This note was created by [employer] voice recognition. Errors in content may be related to improper recognition by the system; efforts to review and correct have been done but errors may still exist. Please be advised the primary purpose of this note is for me to communicate medical care. Standard sentence structure is not always used. Medical terminology and medical abbreviations may be used. There may be grammatical, typographical, and automated fill ins that may have errors missed in proofreading.              [1]   Current Outpatient Medications on File Prior to Visit   Medication Sig Dispense Refill    Prenatal Vit-Fe Fumarate-FA (MULTI PRENATAL) 27-0.8 MG Oral Tab Take by mouth.      norgestrel-ethinyl estradiol (LOW-OGESTREL) 0.3-30 MG-MCG Oral Tab Take 1 tablet by mouth  daily. (Patient not taking: Reported on 11/20/2024) 84 tablet 0     No current facility-administered medications on file prior to visit.   [2] No Known Allergies

## 2024-12-13 ENCOUNTER — TELEPHONE (OUTPATIENT)
Dept: OBGYN CLINIC | Facility: CLINIC | Age: 34
End: 2024-12-13

## 2024-12-13 NOTE — TELEPHONE ENCOUNTER
RN spoke with pt and told her that her Myriad results are still pending. Pt verbalized understanding and agreed with plan of care.

## 2024-12-13 NOTE — TELEPHONE ENCOUNTER
Incoming call from patient following up on the prequel & foresight test results.     Please assist.

## 2024-12-16 ENCOUNTER — TELEPHONE (OUTPATIENT)
Dept: OBGYN CLINIC | Facility: CLINIC | Age: 34
End: 2024-12-16

## 2024-12-16 PROBLEM — Z33.1 LOW RISK PREGNANCY, INCIDENTAL (HCC): Status: ACTIVE | Noted: 2024-12-16

## 2024-12-16 NOTE — TELEPHONE ENCOUNTER
RN called and notified patient of normal prequel results. Gender reveal placed in an envelope left at registration desk in OP

## 2024-12-18 PROBLEM — Z28.39 RUBELLA NON-IMMUNE STATUS, ANTEPARTUM (HCC): Status: ACTIVE | Noted: 2024-12-18

## 2024-12-18 PROBLEM — O09.899 RUBELLA NON-IMMUNE STATUS, ANTEPARTUM (HCC): Status: ACTIVE | Noted: 2024-12-18

## 2024-12-23 ENCOUNTER — TELEPHONE (OUTPATIENT)
Dept: OBGYN CLINIC | Facility: CLINIC | Age: 34
End: 2024-12-23

## 2024-12-23 PROBLEM — Z14.8 GENETIC CARRIER STATUS: Status: ACTIVE | Noted: 2024-12-23

## 2025-01-23 ENCOUNTER — ROUTINE PRENATAL (OUTPATIENT)
Dept: OBGYN CLINIC | Facility: CLINIC | Age: 35
End: 2025-01-23
Payer: MEDICAID

## 2025-01-23 ENCOUNTER — ULTRASOUND ENCOUNTER (OUTPATIENT)
Dept: OBGYN CLINIC | Facility: CLINIC | Age: 35
End: 2025-01-23
Payer: MEDICAID

## 2025-01-23 VITALS
SYSTOLIC BLOOD PRESSURE: 102 MMHG | BODY MASS INDEX: 33.99 KG/M2 | HEIGHT: 61 IN | WEIGHT: 180 LBS | DIASTOLIC BLOOD PRESSURE: 68 MMHG

## 2025-01-23 DIAGNOSIS — Z34.82 ENCOUNTER FOR SUPERVISION OF OTHER NORMAL PREGNANCY IN SECOND TRIMESTER (HCC): Primary | ICD-10-CM

## 2025-01-23 DIAGNOSIS — Z34.00 SUPERVISION OF NORMAL FIRST PREGNANCY, ANTEPARTUM (HCC): Primary | ICD-10-CM

## 2025-01-23 PROBLEM — Z01.419 WOMEN'S ANNUAL ROUTINE GYNECOLOGICAL EXAMINATION: Status: RESOLVED | Noted: 2019-03-06 | Resolved: 2025-01-23

## 2025-01-23 PROBLEM — Z30.41 ENCOUNTER FOR SURVEILLANCE OF CONTRACEPTIVE PILLS: Status: RESOLVED | Noted: 2019-03-06 | Resolved: 2025-01-23

## 2025-01-23 PROBLEM — Z32.02 PREGNANCY EXAMINATION OR TEST, NEGATIVE RESULT: Status: RESOLVED | Noted: 2018-01-24 | Resolved: 2025-01-23

## 2025-01-23 PROCEDURE — 99213 OFFICE O/P EST LOW 20 MIN: CPT | Performed by: OBSTETRICS & GYNECOLOGY

## 2025-01-23 NOTE — PROGRESS NOTES
34 year old  at 20w3d      Contractions: No  VB: No  LOF: No  Fetal movement: Not yet    Return OB  Pre-Mely Care: UTD.   - had anatomy US today, normal (anterior placenta)  - plan for 1 hr GTT and cbc next appointment - orders placed.  Patient Active Problem List   Diagnosis    Irregular periods    PCO (polycystic ovaries)    Low risk free fetal DNA     Rubella non-immune status, antepartum ([facility])    Genetic carrier status    Supervision of normal first pregnancy, antepartum ([facility])       - Return to clinic in: 4 weeks    Total face to face time was 20 minutes, more than 50% of the time was spent in counseling and/or coordination of care related to above discussion and review of results..      Mohini Jung, DO

## 2025-02-25 ENCOUNTER — LAB ENCOUNTER (OUTPATIENT)
Dept: LAB | Facility: REFERENCE LAB | Age: 35
End: 2025-02-25
Attending: OBSTETRICS & GYNECOLOGY
Payer: MEDICAID

## 2025-02-25 ENCOUNTER — ROUTINE PRENATAL (OUTPATIENT)
Dept: OBGYN CLINIC | Facility: CLINIC | Age: 35
End: 2025-02-25
Payer: MEDICAID

## 2025-02-25 VITALS
SYSTOLIC BLOOD PRESSURE: 110 MMHG | BODY MASS INDEX: 35.71 KG/M2 | HEIGHT: 61 IN | WEIGHT: 189.13 LBS | DIASTOLIC BLOOD PRESSURE: 64 MMHG

## 2025-02-25 DIAGNOSIS — Z34.82 ENCOUNTER FOR SUPERVISION OF OTHER NORMAL PREGNANCY IN SECOND TRIMESTER (HCC): ICD-10-CM

## 2025-02-25 DIAGNOSIS — Z34.00 SUPERVISION OF NORMAL FIRST PREGNANCY, ANTEPARTUM (HCC): Primary | ICD-10-CM

## 2025-02-25 DIAGNOSIS — Z34.00 SUPERVISION OF NORMAL FIRST PREGNANCY, ANTEPARTUM (HCC): ICD-10-CM

## 2025-02-25 LAB
DEPRECATED RDW RBC AUTO: 45.5 FL (ref 35.1–46.3)
ERYTHROCYTE [DISTWIDTH] IN BLOOD BY AUTOMATED COUNT: 13.5 % (ref 11–15)
GLUCOSE 1H P GLC SERPL-MCNC: 92 MG/DL
HCT VFR BLD AUTO: 31.1 %
HGB BLD-MCNC: 11 G/DL
MCH RBC QN AUTO: 33 PG (ref 26–34)
MCHC RBC AUTO-ENTMCNC: 35.4 G/DL (ref 31–37)
MCV RBC AUTO: 93.4 FL
PLATELET # BLD AUTO: 295 10(3)UL (ref 150–450)
RBC # BLD AUTO: 3.33 X10(6)UL
WBC # BLD AUTO: 9 X10(3) UL (ref 4–11)

## 2025-02-25 PROCEDURE — 85027 COMPLETE CBC AUTOMATED: CPT

## 2025-02-25 PROCEDURE — 82105 ALPHA-FETOPROTEIN SERUM: CPT

## 2025-02-25 PROCEDURE — 99213 OFFICE O/P EST LOW 20 MIN: CPT | Performed by: OBSTETRICS & GYNECOLOGY

## 2025-02-25 PROCEDURE — 90471 IMMUNIZATION ADMIN: CPT | Performed by: OBSTETRICS & GYNECOLOGY

## 2025-02-25 PROCEDURE — 82950 GLUCOSE TEST: CPT

## 2025-02-25 PROCEDURE — 90656 IIV3 VACC NO PRSV 0.5 ML IM: CPT | Performed by: OBSTETRICS & GYNECOLOGY

## 2025-02-25 PROCEDURE — 36415 COLL VENOUS BLD VENIPUNCTURE: CPT

## 2025-02-25 NOTE — PROGRESS NOTES
RETURN OB VISIT    Alisson Gonzalez is a 34 year old  at 25w1d presenting for return OB visit. Today she reports no contractions, vaginal bleeding or leaking fluid. Starting to feel intermittent fetal movement.     Flu shot today  1hr GTT and CBC today  S/p normal anatomy US     Follow up in 4wk.     Alondra Hampton,

## 2025-02-27 LAB
AFP MOM: 2.91
AFP VALUE: 75.9 NG/ML
GA ON COLL DATE: 15 WEEKS
INSULIN DEP AFP: NO
MAT AGE AT EDD: 32.6 YR
MULTIPLE GEST AFP: NO
OSBR RISK 1 IN AFP: 137
WEIGHT AFP: 177 LBS

## 2025-02-28 ENCOUNTER — TELEPHONE (OUTPATIENT)
Dept: OBGYN CLINIC | Facility: CLINIC | Age: 35
End: 2025-02-28

## 2025-02-28 NOTE — TELEPHONE ENCOUNTER
Althea from [place] lab called with abnormal results.  Patient's AFP cam back positive Open Spina bifida.

## 2025-04-01 ENCOUNTER — ROUTINE PRENATAL (OUTPATIENT)
Dept: OBGYN CLINIC | Facility: CLINIC | Age: 35
End: 2025-04-01
Payer: MEDICAID

## 2025-04-01 VITALS
DIASTOLIC BLOOD PRESSURE: 64 MMHG | HEIGHT: 61 IN | SYSTOLIC BLOOD PRESSURE: 116 MMHG | BODY MASS INDEX: 35.91 KG/M2 | WEIGHT: 190.19 LBS

## 2025-04-01 DIAGNOSIS — Z34.00 SUPERVISION OF NORMAL FIRST PREGNANCY, ANTEPARTUM (HCC): Primary | ICD-10-CM

## 2025-04-01 PROBLEM — Z33.1 LOW RISK PREGNANCY, INCIDENTAL (HCC): Status: RESOLVED | Noted: 2024-12-16 | Resolved: 2025-04-01

## 2025-04-01 PROBLEM — Z14.8 GENETIC CARRIER STATUS: Status: RESOLVED | Noted: 2024-12-23 | Resolved: 2025-04-01

## 2025-04-01 PROCEDURE — 99213 OFFICE O/P EST LOW 20 MIN: CPT | Performed by: OBSTETRICS & GYNECOLOGY

## 2025-04-01 NOTE — PROGRESS NOTES
34 year old  at 30w1d      Contractions: No  VB: No  LOF: No  Fetal movement: positive     Return OB  Pre-Mely Care: UTD.   Growth ultrasound ordered for next visit.   Patient Active Problem List   Diagnosis    Irregular periods    PCO (polycystic ovaries)    Low risk free fetal DNA     Rubella non-immune status, antepartum ([facility])    Genetic carrier status    Supervision of normal first pregnancy, antepartum ([facility])       - Return to clinic in: 2 weeks    Total face to face time was 20 minutes, more than 50% of the time was spent in counseling and/or coordination of care related to above discussion and review of results..      Rosalina Owens MD

## 2025-04-17 ENCOUNTER — ULTRASOUND ENCOUNTER (OUTPATIENT)
Dept: OBGYN CLINIC | Facility: CLINIC | Age: 35
End: 2025-04-17
Payer: MEDICAID

## 2025-04-17 ENCOUNTER — ROUTINE PRENATAL (OUTPATIENT)
Dept: OBGYN CLINIC | Facility: CLINIC | Age: 35
End: 2025-04-17
Payer: MEDICAID

## 2025-04-17 VITALS
SYSTOLIC BLOOD PRESSURE: 110 MMHG | WEIGHT: 192.69 LBS | HEIGHT: 61 IN | BODY MASS INDEX: 36.38 KG/M2 | DIASTOLIC BLOOD PRESSURE: 72 MMHG

## 2025-04-17 DIAGNOSIS — Z34.00 SUPERVISION OF NORMAL FIRST PREGNANCY, ANTEPARTUM (HCC): ICD-10-CM

## 2025-04-17 DIAGNOSIS — Z34.00 SUPERVISION OF NORMAL FIRST PREGNANCY, ANTEPARTUM (HCC): Primary | ICD-10-CM

## 2025-04-17 PROCEDURE — 90471 IMMUNIZATION ADMIN: CPT | Performed by: OBSTETRICS & GYNECOLOGY

## 2025-04-17 PROCEDURE — 90715 TDAP VACCINE 7 YRS/> IM: CPT | Performed by: OBSTETRICS & GYNECOLOGY

## 2025-04-17 PROCEDURE — 99213 OFFICE O/P EST LOW 20 MIN: CPT | Performed by: OBSTETRICS & GYNECOLOGY

## 2025-04-17 NOTE — PROGRESS NOTES
RETURN OB VISIT    Alisson Gonzalez is a 34 year old  at 32w3d presenting for return OB visit. Today she reports no contractions, vaginal bleeding or leaking fluid. Baby is moving well.    S/p growth US today  Tdap today  3rd tri labs ordered  Discussed labor analgesia and perineal massage/support during pushing       Follow up in 2wk.     Alondra Hampton, DO

## 2025-05-02 ENCOUNTER — ROUTINE PRENATAL (OUTPATIENT)
Dept: OBGYN CLINIC | Facility: CLINIC | Age: 35
End: 2025-05-02
Payer: MEDICAID

## 2025-05-02 VITALS
BODY MASS INDEX: 36.7 KG/M2 | WEIGHT: 194.38 LBS | SYSTOLIC BLOOD PRESSURE: 120 MMHG | DIASTOLIC BLOOD PRESSURE: 74 MMHG | HEIGHT: 61 IN

## 2025-05-02 DIAGNOSIS — Z34.83 ENCOUNTER FOR SUPERVISION OF OTHER NORMAL PREGNANCY IN THIRD TRIMESTER (HCC): Primary | ICD-10-CM

## 2025-05-02 PROCEDURE — 99213 OFFICE O/P EST LOW 20 MIN: CPT | Performed by: OBSTETRICS & GYNECOLOGY

## 2025-05-02 NOTE — PROGRESS NOTES
Doing well. No OB complaints. +FM.   Reviewed 3rd trimester care.     ELSI 2 weeks.     Dr. Ap Kowalski MD    [facility]     This note was created by [employer] voice recognition. Errors in content may be related to improper recognition by the system; efforts to review and correct have been done but errors may still exist. Please be advised the primary purpose of this note is for me to communicate medical care. Standard sentence structure is not always used. Medical terminology and medical abbreviations may be used. There may be grammatical, typographical, and automated fill ins that may have errors missed in proofreading.       Problem List[1]     Total patient time was 20 minutes in evaluation and management.          [1]   Patient Active Problem List  Diagnosis    Irregular periods    PCO (polycystic ovaries)    Rubella non-immune status, antepartum ([facility])    Supervision of normal first pregnancy, antepartum ([facility])

## 2025-05-12 ENCOUNTER — ROUTINE PRENATAL (OUTPATIENT)
Dept: OBGYN CLINIC | Facility: CLINIC | Age: 35
End: 2025-05-12
Payer: MEDICAID

## 2025-05-12 ENCOUNTER — LAB ENCOUNTER (OUTPATIENT)
Dept: LAB | Facility: REFERENCE LAB | Age: 35
End: 2025-05-12
Attending: OBSTETRICS & GYNECOLOGY
Payer: MEDICAID

## 2025-05-12 VITALS
HEIGHT: 61 IN | DIASTOLIC BLOOD PRESSURE: 68 MMHG | WEIGHT: 198 LBS | BODY MASS INDEX: 37.38 KG/M2 | SYSTOLIC BLOOD PRESSURE: 114 MMHG

## 2025-05-12 DIAGNOSIS — Z34.00 SUPERVISION OF NORMAL FIRST PREGNANCY, ANTEPARTUM (HCC): Primary | ICD-10-CM

## 2025-05-12 DIAGNOSIS — Z34.00 SUPERVISION OF NORMAL FIRST PREGNANCY, ANTEPARTUM (HCC): ICD-10-CM

## 2025-05-12 LAB — T PALLIDUM AB SER QL IA: NONREACTIVE

## 2025-05-12 PROCEDURE — 87081 CULTURE SCREEN ONLY: CPT | Performed by: OBSTETRICS & GYNECOLOGY

## 2025-05-12 PROCEDURE — 87150 DNA/RNA AMPLIFIED PROBE: CPT | Performed by: OBSTETRICS & GYNECOLOGY

## 2025-05-12 PROCEDURE — 87389 HIV-1 AG W/HIV-1&-2 AB AG IA: CPT

## 2025-05-12 PROCEDURE — 36415 COLL VENOUS BLD VENIPUNCTURE: CPT

## 2025-05-12 PROCEDURE — 86780 TREPONEMA PALLIDUM: CPT

## 2025-05-14 LAB — GROUP B STREP BY PCR FOR PCR OVT: POSITIVE

## 2025-05-15 PROBLEM — O99.820 GROUP B STREPTOCOCCUS CARRIER, ANTEPARTUM (HCC): Chronic | Status: ACTIVE | Noted: 2025-05-15

## 2025-05-15 PROBLEM — Z34.00 SUPERVISION OF NORMAL FIRST PREGNANCY, ANTEPARTUM (HCC): Chronic | Status: ACTIVE | Noted: 2025-01-23

## 2025-05-15 PROBLEM — O09.899 RUBELLA NON-IMMUNE STATUS, ANTEPARTUM (HCC): Chronic | Status: ACTIVE | Noted: 2024-12-18

## 2025-05-15 PROBLEM — O99.820 GROUP B STREPTOCOCCUS CARRIER, ANTEPARTUM (HCC): Status: ACTIVE | Noted: 2025-05-15

## 2025-05-15 PROBLEM — Z28.39 RUBELLA NON-IMMUNE STATUS, ANTEPARTUM (HCC): Chronic | Status: ACTIVE | Noted: 2024-12-18

## 2025-05-21 ENCOUNTER — ROUTINE PRENATAL (OUTPATIENT)
Dept: OBGYN CLINIC | Facility: CLINIC | Age: 35
End: 2025-05-21
Payer: MEDICAID

## 2025-05-21 VITALS
BODY MASS INDEX: 37.32 KG/M2 | DIASTOLIC BLOOD PRESSURE: 62 MMHG | HEIGHT: 61 IN | WEIGHT: 197.69 LBS | SYSTOLIC BLOOD PRESSURE: 106 MMHG

## 2025-05-21 DIAGNOSIS — Z34.83 ENCOUNTER FOR SUPERVISION OF OTHER NORMAL PREGNANCY IN THIRD TRIMESTER (HCC): Primary | ICD-10-CM

## 2025-05-21 PROCEDURE — 99213 OFFICE O/P EST LOW 20 MIN: CPT | Performed by: OBSTETRICS & GYNECOLOGY

## 2025-05-21 NOTE — PROGRESS NOTES
Doing well. No OB complaints. +FM.   Palpated cephalic.   Reviewed labor precautions.   Doing prenatal classes.   Reviewed induction of labor typically in the 40th week or at least by 41 weeks.     ELSI 1 weeks.     Dr. Ap Kowalski MD    [facility]     This note was created by [employer] voice recognition. Errors in content may be related to improper recognition by the system; efforts to review and correct have been done but errors may still exist. Please be advised the primary purpose of this note is for me to communicate medical care. Standard sentence structure is not always used. Medical terminology and medical abbreviations may be used. There may be grammatical, typographical, and automated fill ins that may have errors missed in proofreading.       Problem List[1]       Total patient time was 20 minutes in evaluation and management.          [1]   Patient Active Problem List  Diagnosis    PCO (polycystic ovaries)    Rubella non-immune status, antepartum (HCC)    Supervision of normal first pregnancy, antepartum ([facility])    Group B Streptococcus carrier, antepartum ([facility])

## 2025-05-29 ENCOUNTER — ROUTINE PRENATAL (OUTPATIENT)
Dept: OBGYN CLINIC | Facility: CLINIC | Age: 35
End: 2025-05-29
Payer: MEDICAID

## 2025-05-29 VITALS
WEIGHT: 199 LBS | HEIGHT: 61 IN | BODY MASS INDEX: 37.57 KG/M2 | DIASTOLIC BLOOD PRESSURE: 74 MMHG | SYSTOLIC BLOOD PRESSURE: 122 MMHG

## 2025-05-29 DIAGNOSIS — Z34.00 SUPERVISION OF NORMAL FIRST PREGNANCY, ANTEPARTUM (HCC): Primary | Chronic | ICD-10-CM

## 2025-05-29 PROCEDURE — 99213 OFFICE O/P EST LOW 20 MIN: CPT | Performed by: OBSTETRICS & GYNECOLOGY

## 2025-05-29 NOTE — PROGRESS NOTES
RETURN OB VISIT    Alisson Gonzalez is a 34 year old  at 38w3d presenting for return OB visit. Today she reports no contractions, vaginal bleeding or leaking fluid. Baby is moving well.    Routine prenatal care up to date  Declines SVE today  Discussed possibility of eIOL as early as 39wga, patient will consider      Follow up in 1wk.     Alondra Hampton, DO

## 2025-06-03 ENCOUNTER — ROUTINE PRENATAL (OUTPATIENT)
Dept: OBGYN CLINIC | Facility: CLINIC | Age: 35
End: 2025-06-03
Payer: MEDICAID

## 2025-06-03 VITALS
DIASTOLIC BLOOD PRESSURE: 72 MMHG | WEIGHT: 203.81 LBS | HEIGHT: 61 IN | BODY MASS INDEX: 38.48 KG/M2 | SYSTOLIC BLOOD PRESSURE: 104 MMHG

## 2025-06-03 DIAGNOSIS — Z34.83 ENCOUNTER FOR SUPERVISION OF OTHER NORMAL PREGNANCY IN THIRD TRIMESTER (HCC): Primary | ICD-10-CM

## 2025-06-03 PROCEDURE — 99213 OFFICE O/P EST LOW 20 MIN: CPT | Performed by: OBSTETRICS & GYNECOLOGY

## 2025-06-03 NOTE — PROGRESS NOTES
Doing well. No OB complaints. +FM.   Declined SVE.   Declined induction of labor in the 40th week.   Recommend induction of labor at 41 weeks. Patient agreed. Requested induction of labor for 41 weeks.   ELSI with NST next week.     ELSI 1 week with NST.     Dr. Ap Kowalski MD    [facility]     This note was created by [employer] voice recognition. Errors in content may be related to improper recognition by the system; efforts to review and correct have been done but errors may still exist. Please be advised the primary purpose of this note is for me to communicate medical care. Standard sentence structure is not always used. Medical terminology and medical abbreviations may be used. There may be grammatical, typographical, and automated fill ins that may have errors missed in proofreading.       Problem List[1]     Total patient time was 20 minutes in evaluation and management.          [1]   Patient Active Problem List  Diagnosis    PCO (polycystic ovaries)    Rubella non-immune status, antepartum (HCC)    Supervision of normal first pregnancy, antepartum ([facility])    Group B Streptococcus carrier, antepartum ([facility])

## 2025-06-04 ENCOUNTER — TELEPHONE (OUTPATIENT)
Dept: OBGYN CLINIC | Facility: CLINIC | Age: 35
End: 2025-06-04

## 2025-06-04 NOTE — TELEPHONE ENCOUNTER
6/16 at 8pm     ----- Message from Ap Kowalski sent at 6/3/2025  5:20 PM CDT -----  Regarding: Please schedule IOL  Please schedule the following surgery:Procedure: induction of labor Assist: NoDate:   6/16/25                                 Time Requested: 8pmDx: Post CHITO Pre-op appt: No Admission type: InDepartment of discharge: FloorExpected length of stay: 3-5 days Procedure length time (please enter amount you are requesting): days Recovery time: 6-8 weeksMedical Clearance: NoPost- Op f/u appt time frame: 6 weeks ALL Medicaid/including BCBS community: Tubal/ Hyst form MUST be signed (30 days): N/aMessage to nurses: NoneThank you. Dr. Kowalski

## 2025-06-09 ENCOUNTER — ANESTHESIA EVENT (OUTPATIENT)
Dept: OBGYN UNIT | Facility: HOSPITAL | Age: 35
End: 2025-06-09
Payer: MEDICAID

## 2025-06-09 ENCOUNTER — ANESTHESIA (OUTPATIENT)
Dept: OBGYN UNIT | Facility: HOSPITAL | Age: 35
End: 2025-06-09
Payer: MEDICAID

## 2025-06-09 ENCOUNTER — HOSPITAL ENCOUNTER (INPATIENT)
Facility: HOSPITAL | Age: 35
LOS: 4 days | Discharge: HOME | End: 2025-06-13
Attending: OBSTETRICS & GYNECOLOGY | Admitting: OBSTETRICS & GYNECOLOGY
Payer: MEDICAID

## 2025-06-09 PROBLEM — Z34.90 PREGNANCY (HCC): Status: ACTIVE | Noted: 2025-06-09

## 2025-06-09 LAB
ANTIBODY SCREEN: NEGATIVE
BASOPHILS # BLD AUTO: 0.03 X10(3) UL (ref 0–0.2)
BASOPHILS NFR BLD AUTO: 0.3 %
DEPRECATED RDW RBC AUTO: 46.1 FL (ref 35.1–46.3)
EOSINOPHIL # BLD AUTO: 0.16 X10(3) UL (ref 0–0.7)
EOSINOPHIL NFR BLD AUTO: 1.4 %
ERYTHROCYTE [DISTWIDTH] IN BLOOD BY AUTOMATED COUNT: 14 % (ref 11–15)
HCT VFR BLD AUTO: 32.2 % (ref 35–48)
HGB BLD-MCNC: 11.7 G/DL (ref 12–16)
IMM GRANULOCYTES # BLD AUTO: 0.03 X10(3) UL (ref 0–1)
IMM GRANULOCYTES NFR BLD: 0.3 %
LYMPHOCYTES # BLD AUTO: 1.97 X10(3) UL (ref 1–4)
LYMPHOCYTES NFR BLD AUTO: 17.7 %
MCH RBC QN AUTO: 33.2 PG (ref 26–34)
MCHC RBC AUTO-ENTMCNC: 36.3 G/DL (ref 31–37)
MCV RBC AUTO: 91.5 FL (ref 80–100)
MONOCYTES # BLD AUTO: 0.84 X10(3) UL (ref 0.1–1)
MONOCYTES NFR BLD AUTO: 7.5 %
NEUTROPHILS # BLD AUTO: 8.1 X10 (3) UL (ref 1.5–7.7)
NEUTROPHILS # BLD AUTO: 8.1 X10(3) UL (ref 1.5–7.7)
NEUTROPHILS NFR BLD AUTO: 72.8 %
PLATELET # BLD AUTO: 271 10(3)UL (ref 150–450)
RBC # BLD AUTO: 3.52 X10(6)UL (ref 3.8–5.3)
RH BLOOD TYPE: POSITIVE
T PALLIDUM AB SER QL IA: NONREACTIVE
WBC # BLD AUTO: 11.1 X10(3) UL (ref 4–11)

## 2025-06-09 PROCEDURE — 10907ZC DRAINAGE OF AMNIOTIC FLUID, THERAPEUTIC FROM PRODUCTS OF CONCEPTION, VIA NATURAL OR ARTIFICIAL OPENING: ICD-10-PCS | Performed by: OBSTETRICS & GYNECOLOGY

## 2025-06-09 PROCEDURE — 10H07YZ INSERTION OF OTHER DEVICE INTO PRODUCTS OF CONCEPTION, VIA NATURAL OR ARTIFICIAL OPENING: ICD-10-PCS | Performed by: OBSTETRICS & GYNECOLOGY

## 2025-06-09 PROCEDURE — 10H073Z INSERTION OF MONITORING ELECTRODE INTO PRODUCTS OF CONCEPTION, VIA NATURAL OR ARTIFICIAL OPENING: ICD-10-PCS | Performed by: OBSTETRICS & GYNECOLOGY

## 2025-06-09 RX ORDER — CITRIC ACID/SODIUM CITRATE 334-500MG
30 SOLUTION, ORAL ORAL AS NEEDED
Status: COMPLETED | OUTPATIENT
Start: 2025-06-09 | End: 2025-06-10

## 2025-06-09 RX ORDER — BUPIVACAINE HCL/0.9 % NACL/PF 0.25 %
5 PLASTIC BAG, INJECTION (ML) EPIDURAL AS NEEDED
Status: DISCONTINUED | OUTPATIENT
Start: 2025-06-09 | End: 2025-06-13

## 2025-06-09 RX ORDER — NALBUPHINE HYDROCHLORIDE 10 MG/ML
2.5 INJECTION INTRAMUSCULAR; INTRAVENOUS; SUBCUTANEOUS
Status: DISCONTINUED | OUTPATIENT
Start: 2025-06-09 | End: 2025-06-13

## 2025-06-09 RX ORDER — SODIUM CHLORIDE, SODIUM LACTATE, POTASSIUM CHLORIDE, CALCIUM CHLORIDE 600; 310; 30; 20 MG/100ML; MG/100ML; MG/100ML; MG/100ML
INJECTION, SOLUTION INTRAVENOUS ONCE
Status: DISCONTINUED | OUTPATIENT
Start: 2025-06-09 | End: 2025-06-13

## 2025-06-09 RX ORDER — ONDANSETRON 2 MG/ML
4 INJECTION INTRAMUSCULAR; INTRAVENOUS EVERY 6 HOURS PRN
Status: DISCONTINUED | OUTPATIENT
Start: 2025-06-09 | End: 2025-06-10 | Stop reason: HOSPADM

## 2025-06-09 RX ORDER — DIPHENHYDRAMINE HYDROCHLORIDE 50 MG/ML
25 INJECTION, SOLUTION INTRAMUSCULAR; INTRAVENOUS NIGHTLY PRN
Status: DISCONTINUED | OUTPATIENT
Start: 2025-06-09 | End: 2025-06-10 | Stop reason: HOSPADM

## 2025-06-09 RX ORDER — LIDOCAINE HYDROCHLORIDE AND EPINEPHRINE 15; 5 MG/ML; UG/ML
INJECTION, SOLUTION EPIDURAL
Status: COMPLETED | OUTPATIENT
Start: 2025-06-09 | End: 2025-06-09

## 2025-06-09 RX ORDER — SODIUM CHLORIDE, SODIUM LACTATE, POTASSIUM CHLORIDE, CALCIUM CHLORIDE 600; 310; 30; 20 MG/100ML; MG/100ML; MG/100ML; MG/100ML
INJECTION, SOLUTION INTRAVENOUS AS NEEDED
Status: DISCONTINUED | OUTPATIENT
Start: 2025-06-09 | End: 2025-06-10 | Stop reason: HOSPADM

## 2025-06-09 RX ORDER — SODIUM CHLORIDE 9 MG/ML
INJECTION, SOLUTION INTRAVENOUS ONCE
Status: COMPLETED | OUTPATIENT
Start: 2025-06-09 | End: 2025-06-09

## 2025-06-09 RX ORDER — TERBUTALINE SULFATE 1 MG/ML
0.25 INJECTION SUBCUTANEOUS AS NEEDED
Status: DISCONTINUED | OUTPATIENT
Start: 2025-06-09 | End: 2025-06-10 | Stop reason: HOSPADM

## 2025-06-09 RX ORDER — LIDOCAINE HYDROCHLORIDE 10 MG/ML
30 INJECTION, SOLUTION EPIDURAL; INFILTRATION; INTRACAUDAL; PERINEURAL ONCE
Status: DISCONTINUED | OUTPATIENT
Start: 2025-06-09 | End: 2025-06-10 | Stop reason: HOSPADM

## 2025-06-09 RX ORDER — DEXTROSE, SODIUM CHLORIDE, SODIUM LACTATE, POTASSIUM CHLORIDE, AND CALCIUM CHLORIDE 5; .6; .31; .03; .02 G/100ML; G/100ML; G/100ML; G/100ML; G/100ML
INJECTION, SOLUTION INTRAVENOUS CONTINUOUS
Status: DISCONTINUED | OUTPATIENT
Start: 2025-06-09 | End: 2025-06-10 | Stop reason: HOSPADM

## 2025-06-09 RX ORDER — BUPIVACAINE HYDROCHLORIDE 2.5 MG/ML
20 INJECTION, SOLUTION EPIDURAL; INFILTRATION; INTRACAUDAL; PERINEURAL ONCE
Status: COMPLETED | OUTPATIENT
Start: 2025-06-09 | End: 2025-06-09

## 2025-06-09 RX ORDER — ACETAMINOPHEN 500 MG
500 TABLET ORAL EVERY 6 HOURS PRN
Status: DISCONTINUED | OUTPATIENT
Start: 2025-06-09 | End: 2025-06-10 | Stop reason: HOSPADM

## 2025-06-09 RX ORDER — ACETAMINOPHEN 500 MG
1000 TABLET ORAL EVERY 6 HOURS PRN
Status: DISCONTINUED | OUTPATIENT
Start: 2025-06-09 | End: 2025-06-10 | Stop reason: HOSPADM

## 2025-06-09 RX ORDER — LIDOCAINE HYDROCHLORIDE 10 MG/ML
INJECTION, SOLUTION INFILTRATION; PERINEURAL
Status: COMPLETED | OUTPATIENT
Start: 2025-06-09 | End: 2025-06-09

## 2025-06-09 RX ORDER — IBUPROFEN 600 MG/1
600 TABLET, FILM COATED ORAL ONCE AS NEEDED
Status: DISCONTINUED | OUTPATIENT
Start: 2025-06-09 | End: 2025-06-10 | Stop reason: HOSPADM

## 2025-06-09 RX ORDER — CALCIUM CARBONATE 500 MG/1
1000 TABLET, CHEWABLE ORAL EVERY 4 HOURS PRN
Status: DISCONTINUED | OUTPATIENT
Start: 2025-06-09 | End: 2025-06-10 | Stop reason: HOSPADM

## 2025-06-09 RX ADMIN — LIDOCAINE HYDROCHLORIDE AND EPINEPHRINE 5 ML: 15; 5 INJECTION, SOLUTION EPIDURAL at 09:43:00

## 2025-06-09 RX ADMIN — LIDOCAINE HYDROCHLORIDE 3 ML: 10 INJECTION, SOLUTION INFILTRATION; PERINEURAL at 09:38:00

## 2025-06-09 NOTE — ANESTHESIA PROCEDURE NOTES
Labor Analgesia    Date/Time: 6/9/2025 9:38 AM    Performed by: Wendy Walton MD  Authorized by: Wendy Walton MD      General Information and Staff    Start Time:  6/9/2025 9:38 AM  End Time:  6/9/2025 9:43 AM  Anesthesiologist:  Wendy Walton MD  Performed by:  Anesthesiologist  Patient Location:  OB  Reason for Block: labor epidural    Preanesthetic Checklist: patient identified, IV checked, site marked, risks and benefits discussed, monitors and equipment checked, pre-op evaluation, timeout performed, anesthesia consent and sterile technique used      Procedure Details    Patient Position:  Sitting  Prep: ChloraPrep    Monitoring:  Heart rate  Approach:  Midline    Epidural Needle    Injection Technique:  VIRGILIO air  Needle Type:  Tuohy  Needle Gauge:  18 G  Needle Length:  3.5 in  Needle Insertion Depth:  7  Location:  L3-4    Spinal Needle      Catheter    Catheter Type:  Multi-orifice  Catheter Size:  20 G  Catheter at Skin Depth:  12  Test Dose:  Negative    Assessment      Additional Comments

## 2025-06-09 NOTE — PROGRESS NOTES
Pt is a 34 year old female admitted to [facility].     Chief Complaint   Patient presents with    R/o Rom     Pt c/o leaking since 3am      Pt is  40w0d intra-uterine pregnancy.  History obtained, consents signed. Oriented to room, staff, and plan of care.

## 2025-06-09 NOTE — H&P
OBSTETRICS H&P    Chief Complaint: leaking fluid    HPI: Alisson presents with leaking fluid since 0200 today. Fluid has been clear. Contractions intensifying, now 5/10 intensity. Baby is moving well. No bleeding.     Obstetric History  OB History    Para Term  AB Living   2    1 0   SAB IAB Ectopic Multiple Live Births             # Outcome Date GA Lbr Alphonse/2nd Weight Sex Type Anes PTL Lv   2 Current            1 AB 2011 6w0d    THERAPEUTIC         Birth Comments: eab       Prenatal Issues:   Rubella non-immune  GBS carrier    Vitals  Vitals:    25 0815   BP: 120/80   BP Location: Right arm   Pulse: 77   Resp: 18   Temp: 98.8 °F (37.1 °C)   TempSrc: Oral   Weight: 203 lb (92.1 kg)   Height: 61\"         PHYSICAL EXAM  General: NAD, resting in bed  Chest:symmetric respirations, no increased work of breathing  Abdomen: soft, gravid   SVE 3-4/80/-3 per RN exam   FHT: category I    A/P: Alisson Gonzalez is a 34 year old year old  at 40w0d presenting with SROM.    #Labor  -admission SVE 3-4/80/-3  -start pitocin if contractions space  -ampicillin for GBS ppx   -Pain management: per patient preference        Alondra Hampton DO

## 2025-06-09 NOTE — ANESTHESIA PREPROCEDURE EVALUATION
Anesthesia PreOp Note    HPI:     Alisson Gonzalez is a 34 year old female who presents for preoperative consultation requested by: * No surgeons listed *    Date of Surgery: 6/9/2025    * No procedures listed *  Indication: * No pre-op diagnosis entered *    Relevant Problems   No relevant active problems       NPO:                         History Review:  Patient Active Problem List    Diagnosis Date Noted    Pregnancy ([facility]) 06/09/2025    Group B Streptococcus carrier, antepartum ([facility]) 05/15/2025    Supervision of normal first pregnancy, antepartum ([facility]) 01/23/2025    Rubella non-immune status, antepartum ([facility]) 12/18/2024    PCO (polycystic ovaries) 01/29/2018       Past Medical History[1]    Past Surgical History[2]    Prescriptions Prior to Admission[3]  Current Medications and Prescriptions Ordered in Epic[4]    Allergies[5]    Family History[6]  Social Hx on file[7]    Available pre-op labs reviewed.  Lab Results   Component Value Date    WBC 11.1 (H) 06/09/2025    RBC 3.52 (L) 06/09/2025    HGB 11.7 (L) 06/09/2025    HCT 32.2 (L) 06/09/2025    MCV 91.5 06/09/2025    MCH 33.2 06/09/2025    MCHC 36.3 06/09/2025    RDW 14.0 06/09/2025    .0 06/09/2025             Vital Signs:  Body mass index is 38.36 kg/m².   height is 1.549 m (5' 1\") and weight is 92.1 kg (203 lb). Her oral temperature is 98.8 °F (37.1 °C). Her blood pressure is 120/80 and her pulse is 77. Her respiration is 18.   Vitals:    06/09/25 0815   BP: 120/80   Pulse: 77   Resp: 18   Temp: 98.8 °F (37.1 °C)   TempSrc: Oral   Weight: 92.1 kg (203 lb)   Height: 1.549 m (5' 1\")        Anesthesia Evaluation     Patient summary reviewed and Nursing notes reviewed    No history of anesthetic complications   Airway   Dental      Pulmonary - negative ROS   Cardiovascular - negative ROS  Exercise tolerance: good    Neuro/Psych - negative ROS     GI/Hepatic/Renal - negative ROS     Endo/Other - negative ROS   Abdominal                  Anesthesia Plan:    ASA:  2  Emergent    Plan:   Epidural  Post-op Pain Management: Continuous epidural  Plan Comments: Neuraxial anesthesia was explained in detail to the patient, addressing the technique, intended outcome and known adverse events namely spinal or epidural bleeding, infection, post dural puncture headaches, complete spinal block with resulting cardiovascular and respiratory failure, block failure and or need for multiple attempts or switching to general anesthesia.   Informed Consent Plan and Risks Discussed With:  Patient  Discussed plan with:  Attending      I have informed Alisson Mackenzieoleda and/or legal guardian or family member of the nature of the anesthetic plan, benefits, risks including possible dental damage if relevant, major complications, and any alternative forms of anesthetic management.   All of the patient's questions were answered to the best of my ability. The patient desires the anesthetic management as planned.  Wendy Walton MD  6/9/2025 9:32 AM  Present on Admission:  **None**           [1]   Past Medical History:   Patient denies medical problems    as of 01/24/2018   [2]   Past Surgical History:  Procedure Laterality Date    Patient denies any surgical history      as of 03/06/2019   [3]   Medications Prior to Admission   Medication Sig Dispense Refill Last Dose/Taking    Prenatal Vit-Fe Fumarate-FA (MULTI PRENATAL) 27-0.8 MG Oral Tab Take by mouth.   6/8/2025   [4]   Current Facility-Administered Medications Ordered in Epic   Medication Dose Route Frequency Provider Last Rate Last Admin    dextrose in lactated ringers 5% infusion   Intravenous Continuous Dubovis, Alondra, DO        lactated ringers infusion   Intravenous PRN Dubovis, Alondra, DO        lactated ringers IV bolus 500 mL  500 mL Intravenous PRN Dubovis, Alondra, DO        acetaminophen (Tylenol Extra Strength) tab 500 mg  500 mg Oral Q6H PRN Dubovis, Alondra, DO        acetaminophen (Tylenol Extra Strength) tab 1,000 mg  1,000 mg Oral  Q6H PRN Dubovis, Alondra, DO        ibuprofen (Motrin) tab 600 mg  600 mg Oral Once PRN Dubovis, Alondra, DO        ondansetron (Zofran) 4 MG/2ML injection 4 mg  4 mg Intravenous Q6H PRN Dubovis, Alondra, DO        oxyTOCIN in sodium chloride 0.9% (Pitocin) 30 Units/500mL infusion premix  62.5-900 carlos-units/min Intravenous Continuous Dubovis, Alondra, DO        terbutaline (Brethine) 1 MG/ML injection 0.25 mg  0.25 mg Subcutaneous PRN Dubovis, Alondra, DO        sodium citrate-citric acid (Bicitra) 500-334 MG/5ML oral solution 30 mL  30 mL Oral PRN Dubovis, Alondra, DO        lidocaine PF (Xylocaine-MPF) 1% injection  30 mL Intradermal Once Dubovis, Alondra, DO        fentaNYL (Sublimaze) 50 mcg/mL injection 100 mcg  100 mcg Intravenous Once Dubovis, Alondra, DO        fentaNYL (Sublimaze) 50 mcg/mL injection 50 mcg  50 mcg Intravenous Q30 Min PRN Dubovis, Alondra, DO        diphenhydrAMINE (Benadryl) 50 mg/mL  injection 25 mg  25 mg Intravenous Nightly PRN Franciss, Alondra, DO        ampicillin (Omnipen) 1 g in sodium chloride 0.9% 100mL IVPB-VILMA  1 g Intravenous Q4H Dubovis, Alondra, DO        oxyTOCIN in sodium chloride 0.9% (Pitocin) 30 Units/500mL infusion premix  0.5-20 carlos-units/min Intravenous Continuous Dubovis, Alondra, DO        calcium carbonate (Tums) chewable tab 1,000 mg  1,000 mg Oral Q4H PRN Dubovis, Alondra, DO        fentaNYL-bupivacaine 2 mcg/mL-0.125% in sodium chloride 0.9% 100 mL EPIDURAL infusion premix   Epidural Continuous Billingsley, Nabil, DO        fentaNYL (Sublimaze) 50 mcg/mL injection 100 mcg  100 mcg Epidural Once Billingsley, Nabil, DO        bupivacaine PF (Marcaine) 0.25% injection  20 mL Epidural Once Billingsley, Nabil, DO        ePHEDrine (PF) 25 MG/5 ML injection 5 mg  5 mg Intravenous PRN Billingsley, Nabil, DO        nalbuphine (Nubain) 10 mg/mL injection 2.5 mg  2.5 mg Intravenous Q15 Min PRN Nabil Billingsley,          No current Epic-ordered outpatient medications on file.   [5] No Known Allergies  [6]    Family History  Problem Relation Age of Onset    Diabetes Father     No Known Problems Mother     No Known Problems Maternal Grandmother     Diabetes Maternal Grandfather     No Known Problems Paternal Grandmother     Diabetes Paternal Grandfather     Other (Other) Sister         Deaf   [7]   Social History  Socioeconomic History    Marital status: Single   Tobacco Use    Smoking status: Never    Smokeless tobacco: Never   Substance and Sexual Activity    Alcohol use: Not Currently    Drug use: No    Sexual activity: Yes     Partners: Male   Other Topics Concern    Blood Transfusions No

## 2025-06-09 NOTE — PROGRESS NOTES
Patient assessed due to discontinuous tracing. SVE 6/90/-2 AROM of forebag, IUPC and FSE placed. FHT overall reassuring, occasional subtle late decelerations, however, difficult to ascertain due to discontinuity, will reposition and continue to monitor. Titrate pitocin pending reassuring tracing and IUPC measurement.     Alondra Hampton, DO

## 2025-06-09 NOTE — PLAN OF CARE
Problem: Patient Centered Care  Goal: Patient preferences are identified and integrated in the patient's plan of care  Description: Interventions:  - What would you like us to know as we care for you? It's a boy!   - Provide timely, complete, and accurate information to patient/family  - Incorporate patient and family knowledge, values, beliefs, and cultural backgrounds into the planning and delivery of care  - Encourage patient/family to participate in care and decision-making at the level they choose  - Honor patient and family perspectives and choices  Outcome: Progressing     Problem: Patient/Family Goals  Goal: Patient/Family Long Term Goal  Description: Patient's Long Term Goal: Uncomplicated Delivery     Interventions:  - Assessment/Monitoring  - Induction/Augmentation per protocol and Provider order  - C/S per protocol and Provider order   - Education  - Intervention per protocol and Provider order with education   - Involve patient in POC  - See additional Care Plan goals for specific interventions  Outcome: Progressing  Goal: Patient/Family Short Term Goal  Description: Patient's Short Term Goal: Comfort and Pain Control     Interventions:   - Non Pharmacological pain intervention   - IV/IM and Epidural pain medication per Provider order and patient request  - Education  - Involve Patient in POC   - See additional Care Plan goals for specific interventions  Outcome: Progressing     Problem: BIRTH - VAGINAL/ SECTION  Goal: Fetal and maternal status remain reassuring during the birth process  Description: Interventions:   - Monitor vital signs  - Monitor fetal heart rate  - Monitor uterine activity  - Monitor labor progression (vaginal delivery)  - DVT prophylaxis (C/S delivery)  - Surgical antibiotic prophylaxis (C/S delivery)  Outcome: Progressing     Problem: PAIN - ADULT  Goal: Verbalizes/displays adequate comfort level or patient's stated pain goal  Description: Interventions:   - Encourage pt  to monitor pain and request assistance  - Assess pain using appropriate pain scale  - Administer analgesics based on type and severity of pain and evaluate response  - Implement non-pharmacological measures as appropriate and evaluate response  - Consider cultural and social influences on pain and pain management  - Manage/alleviate anxiety  - Utilize distraction and/or relaxation techniques  - Monitor for opioid side effects  - Notify MD/LIP if interventions unsuccessful or patient reports new pain  - Anticipate increased pain with activity and pre-medicate as appropriate  Outcome: Progressing     Problem: ANXIETY  Goal: Will report anxiety at manageable levels  Description: Interventions:   - Administer medication as ordered  - Teach and rehearse alternative coping skills  - Provide emotional support with 1:1 interaction with staff  Outcome: Progressing

## 2025-06-10 PROCEDURE — 59514 CESAREAN DELIVERY ONLY: CPT | Performed by: OBSTETRICS & GYNECOLOGY

## 2025-06-10 RX ORDER — HALOPERIDOL 5 MG/ML
0.5 INJECTION INTRAMUSCULAR ONCE AS NEEDED
Status: ACTIVE | OUTPATIENT
Start: 2025-06-10 | End: 2025-06-10

## 2025-06-10 RX ORDER — LIDOCAINE HYDROCHLORIDE AND EPINEPHRINE 20; 5 MG/ML; UG/ML
INJECTION, SOLUTION EPIDURAL; INFILTRATION; INTRACAUDAL; PERINEURAL AS NEEDED
Status: DISCONTINUED | OUTPATIENT
Start: 2025-06-10 | End: 2025-06-10 | Stop reason: SURG

## 2025-06-10 RX ORDER — HYDROCODONE BITARTRATE AND ACETAMINOPHEN 7.5; 325 MG/1; MG/1
1 TABLET ORAL EVERY 6 HOURS PRN
Refills: 0 | Status: ACTIVE | OUTPATIENT
Start: 2025-06-10 | End: 2025-06-11

## 2025-06-10 RX ORDER — FAMOTIDINE 10 MG/ML
20 INJECTION, SOLUTION INTRAVENOUS ONCE
Status: COMPLETED | OUTPATIENT
Start: 2025-06-10 | End: 2025-06-10

## 2025-06-10 RX ORDER — MORPHINE SULFATE 1 MG/ML
INJECTION, SOLUTION EPIDURAL; INTRATHECAL; INTRAVENOUS AS NEEDED
Status: DISCONTINUED | OUTPATIENT
Start: 2025-06-10 | End: 2025-06-10 | Stop reason: SURG

## 2025-06-10 RX ORDER — IBUPROFEN 600 MG/1
600 TABLET, FILM COATED ORAL EVERY 6 HOURS
Status: DISCONTINUED | OUTPATIENT
Start: 2025-06-10 | End: 2025-06-13

## 2025-06-10 RX ORDER — DIPHENHYDRAMINE HYDROCHLORIDE 50 MG/ML
12.5 INJECTION, SOLUTION INTRAMUSCULAR; INTRAVENOUS EVERY 4 HOURS PRN
Status: DISPENSED | OUTPATIENT
Start: 2025-06-10 | End: 2025-06-11

## 2025-06-10 RX ORDER — DOCUSATE SODIUM 100 MG/1
100 CAPSULE, LIQUID FILLED ORAL
Status: DISCONTINUED | OUTPATIENT
Start: 2025-06-10 | End: 2025-06-13

## 2025-06-10 RX ORDER — GABAPENTIN 300 MG/1
300 CAPSULE ORAL EVERY 8 HOURS PRN
Status: DISCONTINUED | OUTPATIENT
Start: 2025-06-10 | End: 2025-06-13

## 2025-06-10 RX ORDER — METOCLOPRAMIDE HYDROCHLORIDE 5 MG/ML
10 INJECTION INTRAMUSCULAR; INTRAVENOUS ONCE
Status: COMPLETED | OUTPATIENT
Start: 2025-06-10 | End: 2025-06-10

## 2025-06-10 RX ORDER — KETOROLAC TROMETHAMINE 30 MG/ML
30 INJECTION, SOLUTION INTRAMUSCULAR; INTRAVENOUS ONCE
Status: COMPLETED | OUTPATIENT
Start: 2025-06-10 | End: 2025-06-10

## 2025-06-10 RX ORDER — METOCLOPRAMIDE HYDROCHLORIDE 5 MG/ML
INJECTION INTRAMUSCULAR; INTRAVENOUS
Status: COMPLETED
Start: 2025-06-10 | End: 2025-06-10

## 2025-06-10 RX ORDER — HYDROMORPHONE HYDROCHLORIDE 1 MG/ML
0.6 INJECTION, SOLUTION INTRAMUSCULAR; INTRAVENOUS; SUBCUTANEOUS
Refills: 0 | Status: ACTIVE | OUTPATIENT
Start: 2025-06-10 | End: 2025-06-11

## 2025-06-10 RX ORDER — ONDANSETRON 2 MG/ML
4 INJECTION INTRAMUSCULAR; INTRAVENOUS ONCE AS NEEDED
Status: ACTIVE | OUTPATIENT
Start: 2025-06-10 | End: 2025-06-10

## 2025-06-10 RX ORDER — NALOXONE HYDROCHLORIDE 0.4 MG/ML
0.08 INJECTION, SOLUTION INTRAMUSCULAR; INTRAVENOUS; SUBCUTANEOUS
Status: ACTIVE | OUTPATIENT
Start: 2025-06-10 | End: 2025-06-11

## 2025-06-10 RX ORDER — METOCLOPRAMIDE HYDROCHLORIDE 5 MG/ML
10 INJECTION INTRAMUSCULAR; INTRAVENOUS ONCE
Status: DISCONTINUED | OUTPATIENT
Start: 2025-06-10 | End: 2025-06-10

## 2025-06-10 RX ORDER — SODIUM CHLORIDE, SODIUM LACTATE, POTASSIUM CHLORIDE, CALCIUM CHLORIDE 600; 310; 30; 20 MG/100ML; MG/100ML; MG/100ML; MG/100ML
INJECTION, SOLUTION INTRAVENOUS CONTINUOUS PRN
Status: DISCONTINUED | OUTPATIENT
Start: 2025-06-10 | End: 2025-06-10 | Stop reason: SURG

## 2025-06-10 RX ORDER — TRANEXAMIC ACID 10 MG/ML
INJECTION, SOLUTION INTRAVENOUS AS NEEDED
Status: DISCONTINUED | OUTPATIENT
Start: 2025-06-10 | End: 2025-06-10 | Stop reason: SURG

## 2025-06-10 RX ORDER — FAMOTIDINE 10 MG/ML
INJECTION, SOLUTION INTRAVENOUS
Status: COMPLETED
Start: 2025-06-10 | End: 2025-06-10

## 2025-06-10 RX ORDER — METOCLOPRAMIDE 10 MG/1
10 TABLET ORAL ONCE
Status: DISCONTINUED | OUTPATIENT
Start: 2025-06-10 | End: 2025-06-10

## 2025-06-10 RX ORDER — FAMOTIDINE 20 MG/1
20 TABLET, FILM COATED ORAL ONCE
Status: DISCONTINUED | OUTPATIENT
Start: 2025-06-10 | End: 2025-06-10

## 2025-06-10 RX ORDER — ACETAMINOPHEN 325 MG/1
650 TABLET ORAL EVERY 6 HOURS PRN
Status: ACTIVE | OUTPATIENT
Start: 2025-06-10 | End: 2025-06-11

## 2025-06-10 RX ORDER — PROCHLORPERAZINE EDISYLATE 5 MG/ML
5 INJECTION INTRAMUSCULAR; INTRAVENOUS ONCE AS NEEDED
Status: ACTIVE | OUTPATIENT
Start: 2025-06-10 | End: 2025-06-10

## 2025-06-10 RX ORDER — FAMOTIDINE 20 MG/1
20 TABLET, FILM COATED ORAL ONCE
Status: COMPLETED | OUTPATIENT
Start: 2025-06-10 | End: 2025-06-10

## 2025-06-10 RX ORDER — ACETAMINOPHEN 500 MG
1000 TABLET ORAL ONCE
Status: DISCONTINUED | OUTPATIENT
Start: 2025-06-10 | End: 2025-06-10

## 2025-06-10 RX ORDER — KETOROLAC TROMETHAMINE 30 MG/ML
30 INJECTION, SOLUTION INTRAMUSCULAR; INTRAVENOUS EVERY 6 HOURS
Status: DISCONTINUED | OUTPATIENT
Start: 2025-06-10 | End: 2025-06-10

## 2025-06-10 RX ORDER — ACETAMINOPHEN 500 MG
1000 TABLET ORAL ONCE
Status: DISCONTINUED | OUTPATIENT
Start: 2025-06-10 | End: 2025-06-10 | Stop reason: HOSPADM

## 2025-06-10 RX ORDER — CITRIC ACID/SODIUM CITRATE 334-500MG
SOLUTION, ORAL ORAL
Status: COMPLETED
Start: 2025-06-10 | End: 2025-06-10

## 2025-06-10 RX ORDER — METOCLOPRAMIDE 10 MG/1
10 TABLET ORAL ONCE
Status: COMPLETED | OUTPATIENT
Start: 2025-06-10 | End: 2025-06-10

## 2025-06-10 RX ORDER — SODIUM CHLORIDE, SODIUM LACTATE, POTASSIUM CHLORIDE, CALCIUM CHLORIDE 600; 310; 30; 20 MG/100ML; MG/100ML; MG/100ML; MG/100ML
INJECTION, SOLUTION INTRAVENOUS CONTINUOUS
Status: DISCONTINUED | OUTPATIENT
Start: 2025-06-10 | End: 2025-06-13

## 2025-06-10 RX ORDER — SIMETHICONE 80 MG
80 TABLET,CHEWABLE ORAL 3 TIMES DAILY PRN
Status: DISCONTINUED | OUTPATIENT
Start: 2025-06-10 | End: 2025-06-13

## 2025-06-10 RX ORDER — AMMONIA 15 % (W/V)
0.3 AMPUL (EA) INHALATION AS NEEDED
Status: DISCONTINUED | OUTPATIENT
Start: 2025-06-10 | End: 2025-06-13

## 2025-06-10 RX ORDER — HYDROCODONE BITARTRATE AND ACETAMINOPHEN 7.5; 325 MG/1; MG/1
2 TABLET ORAL EVERY 6 HOURS PRN
Refills: 0 | Status: ACTIVE | OUTPATIENT
Start: 2025-06-10 | End: 2025-06-11

## 2025-06-10 RX ORDER — DIPHENHYDRAMINE HCL 25 MG
25 CAPSULE ORAL EVERY 4 HOURS PRN
Status: ACTIVE | OUTPATIENT
Start: 2025-06-10 | End: 2025-06-11

## 2025-06-10 RX ORDER — NALBUPHINE HYDROCHLORIDE 10 MG/ML
2.5 INJECTION INTRAMUSCULAR; INTRAVENOUS; SUBCUTANEOUS
Status: DISCONTINUED | OUTPATIENT
Start: 2025-06-10 | End: 2025-06-13

## 2025-06-10 RX ORDER — IBUPROFEN 600 MG/1
600 TABLET, FILM COATED ORAL EVERY 6 HOURS
Status: DISCONTINUED | OUTPATIENT
Start: 2025-06-11 | End: 2025-06-10

## 2025-06-10 RX ORDER — ACETAMINOPHEN 500 MG
1000 TABLET ORAL EVERY 6 HOURS
Status: DISCONTINUED | OUTPATIENT
Start: 2025-06-10 | End: 2025-06-13

## 2025-06-10 RX ORDER — HYDROMORPHONE HYDROCHLORIDE 1 MG/ML
0.4 INJECTION, SOLUTION INTRAMUSCULAR; INTRAVENOUS; SUBCUTANEOUS
Refills: 0 | Status: ACTIVE | OUTPATIENT
Start: 2025-06-10 | End: 2025-06-11

## 2025-06-10 RX ORDER — NALBUPHINE HYDROCHLORIDE 10 MG/ML
2.5 INJECTION INTRAMUSCULAR; INTRAVENOUS; SUBCUTANEOUS EVERY 4 HOURS PRN
Status: ACTIVE | OUTPATIENT
Start: 2025-06-10 | End: 2025-06-11

## 2025-06-10 RX ORDER — BISACODYL 10 MG
10 SUPPOSITORY, RECTAL RECTAL ONCE AS NEEDED
Status: DISCONTINUED | OUTPATIENT
Start: 2025-06-10 | End: 2025-06-13

## 2025-06-10 RX ORDER — FAMOTIDINE 10 MG/ML
20 INJECTION, SOLUTION INTRAVENOUS ONCE
Status: DISCONTINUED | OUTPATIENT
Start: 2025-06-10 | End: 2025-06-10

## 2025-06-10 RX ORDER — ONDANSETRON 2 MG/ML
4 INJECTION INTRAMUSCULAR; INTRAVENOUS EVERY 6 HOURS PRN
Status: DISCONTINUED | OUTPATIENT
Start: 2025-06-10 | End: 2025-06-13

## 2025-06-10 RX ADMIN — LIDOCAINE HYDROCHLORIDE AND EPINEPHRINE 5 ML: 20; 5 INJECTION, SOLUTION EPIDURAL; INFILTRATION; INTRACAUDAL; PERINEURAL at 03:21:00

## 2025-06-10 RX ADMIN — TRANEXAMIC ACID 1000 MG: 10 INJECTION, SOLUTION INTRAVENOUS at 03:30:00

## 2025-06-10 RX ADMIN — SODIUM CHLORIDE, SODIUM LACTATE, POTASSIUM CHLORIDE, CALCIUM CHLORIDE: 600; 310; 30; 20 INJECTION, SOLUTION INTRAVENOUS at 03:15:00

## 2025-06-10 RX ADMIN — LIDOCAINE HYDROCHLORIDE AND EPINEPHRINE 10 ML: 20; 5 INJECTION, SOLUTION EPIDURAL; INFILTRATION; INTRACAUDAL; PERINEURAL at 03:17:00

## 2025-06-10 RX ADMIN — MORPHINE SULFATE 2 MG: 1 INJECTION, SOLUTION EPIDURAL; INTRATHECAL; INTRAVENOUS at 03:49:00

## 2025-06-10 NOTE — ANESTHESIA POSTPROCEDURE EVALUATION
Patient: Alisson Gonzalez    Procedure Summary       Date: 25 Room / Location: [facility] L+D OR  / [facility] L+D OR    Anesthesia Start: 938 Anesthesia Stop: 06/10/25 0414    Procedure:  SECTION (Abdomen) Diagnosis:       Failure of fetal descent in labor, delivered, current hospitalization (HCC)      (Failure of fetal descent in labor, delivered, current hospitalization (HCC) [O62.2])    Surgeons: Alondra Hampton DO Anesthesiologist: Wendy Walton MD    Anesthesia Type: epidural ASA Status: 2 - Emergent            Anesthesia Type: epidural    Vitals Value Taken Time   /60 06/10/25 05:20   Temp  06/10/25 05:24   Pulse 108 06/10/25 05:23   Resp 18 06/10/25 05:23   SpO2 95 % 06/10/25 05:23   Vitals shown include unfiled device data.    EM AN Post Evaluation:   Patient Evaluated in PACU  Patient Participation: complete - patient participated  Level of Consciousness: awake and alert  Pain Score: 0  Pain Management: adequate  Airway Patency:patent  Dental exam unchanged from preop  Yes    Nausea/Vomiting: minimal  Cardiovascular Status: acceptable  Respiratory Status: acceptable  Postoperative Hydration acceptable      Wendy Walton MD  6/10/2025 5:24 AM

## 2025-06-10 NOTE — PROGRESS NOTES
Patient pushing with good effort x2.5hrs now. Fetal station unchanged, now with 1cm of caput. Reviewed poor prognosis for vaginal delivery though not technically meeting criteria for arrest of descent yet. Offered  delivery, patient amenable.     We discussed risks of  section including bleeding, infection, and injury to surrounding organs including uterus, fallopian tubes, bowel, bladder, ureters, and infant. We discussed risk of hemorrhage and medications that may be used to manage atony. We discussed possibility of needing blood transfusion and associated risks of viral infection and transfusion reaction. We discussed risk of surgical infection and infection-prevention measures. We discussed risk of  hysterectomy if conservative measures fail to control bleeding. All questions answered, consent signed.

## 2025-06-10 NOTE — PROGRESS NOTES
Patient more uncomfortable with contraction pain, no strong urge to push. Small anterior lip reducible with valsalva, SVE 10/100/-1. FHT reassuring. Will start pushing.     Alondra Hampton, DO

## 2025-06-10 NOTE — PROGRESS NOTES
Patient pushing with good effort and in different positions for about 1.5hrs. Repeat SVE without fetal descent. FHT overall reassuring, maternal status reassuring. Advised patient she has not yet met criteria for arrest of descent, however, given lack of progress thus far I am not optimistic for a vaginal delivery. Fetal station currently too high for operative vaginal delivery. Discussed option for  delivery now. Patient would like to try pushing longer. Will re-assess at 0245.     Alondra Hampton, DO

## 2025-06-10 NOTE — PROGRESS NOTES
Patient transferred to mother/baby room 368 per cart in stable condition with baby and personal belongings.  Accompanied by  and staff.

## 2025-06-10 NOTE — PROGRESS NOTES
SVE unchanged x2hrs, FHT with recurrent subtle late decelerations. Spontaneous contractions q4 min. Will continue to reposition and attempt amnioinfusion. Discussed that if no improvement and still remote from delivery at 1945 will recommend  delivery.     Alondra Hampton, DO

## 2025-06-10 NOTE — PLAN OF CARE
Problem: Patient Centered Care  Goal: Patient preferences are identified and integrated in the patient's plan of care  Description: Interventions:  - What would you like us to know as we care for you? It's a boy!   - Provide timely, complete, and accurate information to patient/family  - Incorporate patient and family knowledge, values, beliefs, and cultural backgrounds into the planning and delivery of care  - Encourage patient/family to participate in care and decision-making at the level they choose  - Honor patient and family perspectives and choices  Outcome: Progressing     Problem: BIRTH - VAGINAL/ SECTION  Goal: Fetal and maternal status remain reassuring during the birth process  Description: Interventions:   - Monitor vital signs  - Monitor fetal heart rate  - Monitor uterine activity  - Monitor labor progression (vaginal delivery)  - DVT prophylaxis (C/S delivery)  - Surgical antibiotic prophylaxis (C/S delivery)  Outcome: Progressing     Problem: PAIN - ADULT  Goal: Verbalizes/displays adequate comfort level or patient's stated pain goal  Description: Interventions:   - Encourage pt to monitor pain and request assistance  - Assess pain using appropriate pain scale  - Administer analgesics based on type and severity of pain and evaluate response  - Implement non-pharmacological measures as appropriate and evaluate response  - Consider cultural and social influences on pain and pain management  - Manage/alleviate anxiety  - Utilize distraction and/or relaxation techniques  - Monitor for opioid side effects  - Notify MD/LIP if interventions unsuccessful or patient reports new pain  - Anticipate increased pain with activity and pre-medicate as appropriate  Outcome: Progressing     Problem: ANXIETY  Goal: Will report anxiety at manageable levels  Description: Interventions:   - Administer medication as ordered  - Teach and rehearse alternative coping skills  - Provide emotional support with 1:1  interaction with staff  Outcome: Progressing

## 2025-06-10 NOTE — OPERATIVE REPORT
SECTION OPERATIVE REPORT  Date: 6/10/2025  Patient: Alisson Gonzalez    Pre-operative Diagnosis:  Live intrauterine gestation at 40w1d  Arrest of descent  Suspected gestational hypertension     Post-operative Diagnosis:  Same - delivered      Surgeon: Alondra Hampton DO  Assistant: Chano Marshall MD    Pre-operative antibiotics: Ancef, azithromycin  QBL: 757mL  Blood products: None  Additional medications: TXA  Specimen: Umbilical cord segment for blood gas analysis    Operative Findings:  Normal appearing uterus and bilateral adnexa  Clear amniotic fluid     Birth information: Liveborn male infant, cephalic OT presentation, nuchal cord x1, Apgars 8/9     PROCEDURE:    The patient was brought to the operating room where she was placed in dorsal supine position with a leftward tilt and epidural anesthesia was found to be adequate. She was then prepped and draped in the usual sterile fashion. A scalpel was used to create a Pfannenstiel skin incision. The incision was then carried down to the underlying fascia using scalpel and Bovie. The fascia was incised to the left and right of midline using the scalpel and the incision was extended bilaterally using Yao scissors. The superior edge of the fascia was then grasped using Kochers and elevated while the underlying muscle was dissected off bluntly. The inferior edge of the fascia was then grasped with Kochers and elevated while the underlying muscle was dissected off bluntly. The rectus muscles were  in the midline and the underlying peritoneum identified and entered bluntly. The bladder blade was then inserted and the vesicouterine peritoneum tented up and entered sharply to create the bladder flap. The bladder flap was extended bilaterally and the bladder blade replaced to safely retract the bladder caudad. The lower uterine segment was then incised with the scalpel and extended bluntly. The fetal head was brought to the hysterotomy and fundal  pressure applied. The fetal head delivered without issue, followed by the shoulders and body. The umbilical cord was clamped after 30 seconds and cut. The infant was handed off to waiting  staff. The placenta was removed manually and noted to be intact. The uterus was then exteriorized and the hysterotomy closed using 0-Vicryl suture in running, locked fashion. A second imbricating layer of the same suture was placed to achieve hemostasis. The gutters were cleared of residual clots and the uterus returned to the abdomen. The superior edge of the fascia was again elevated with Kochers and the underlying muscle examined and found to be intact. The inferior edge of the fascia was then grasped with Kochers and elevated and the underlying muscle examined and found to also be intact. The fascia was then closed using 0-Vicryl suture in a running fashion. Skin was closed using 4-0 Vicryl and Dermabond and Tegaderm was applied. The patient tolerated the procedure well and was taken to PACU in stable condition.     Alondra Hampton DO

## 2025-06-10 NOTE — CONSULTS
Pod 1 csection with epidural anesthesia and duramorph for post op pain. Pt tolerated procedure well good post op pain relief  no headaceh no backache  cpm

## 2025-06-10 NOTE — LACTATION NOTE
LACTATION NOTE - MOTHER      Evaluation Type: Inpatient    Problems identified  Problems identified: Knowledge deficit    Maternal history  Maternal history: Caesarean section, Polycystic ovarian syndrome (PCOS)    Breastfeeding goal  Breastfeeding goal: To maintain breast milk feeding per patient goal    Maternal Assessment  Bilateral Breasts: Wide spaced, Tubular shaped, Soft  Bilateral Nipples: Slightly everted/short, Colostrum easily expressed  Prior breastfeeding experience (comment below): Primip  Breastfeeding Assistance: Breastfeeding assistance provided with permission, Hand expression provided with permission    Pain assessment  Location/Comment: denies  Treatment of Sore Nipples: Lanolin                   Assisted with a scheduled feeding. Baby sleepy, placed skin to skin, showed a few feeding cues, attempt to latch and then hand expressed drops.  Encouraged STS. Discussed hand expression and spoon feeding if the infant is too sleepy to nurse. Discussed normal NB behavior. Educated patient about supply/demand and the importance of frequent stimulation. Encouraged to call LC if assistance with breastfeeding is needed.

## 2025-06-10 NOTE — PROGRESS NOTES
Patient comfortable, starting to appreciate some intermittent rectal pressure. SVE AL/100/-1, lip unable to be reduced with practice push. FHT with decelerations while in right lateral, improved when supine. Deonte adequately on 2mu of pitocin. Continue position changes, plan to re-assess in 1hr.     Alondra Hampton, DO

## 2025-06-10 NOTE — PROGRESS NOTES
Called by nurse for allergic reaction.    Patient and nurse noted a rash on her chest and face. Some welts and itching.  The patient had french fries and grilled cheese for lunch. Reports no history of any medication or food allergies.    The only medication patient received recently was IV toradol, which she had also received this morning with no reaction.    Nurse gave IV bendaryl.    On my exam, several raised, welts on chin and chest.   Abdomen with no lesions. Tegaderm c/d/I, no erythema, no welts or other rash near incision site.    A/P:  Allergic reaction to unknown substance  - IV pepcid now to decrease histamine response.  - dc toradol, trial PO ibuprofen with close monitoring for rash after taking. If rash, then needs to avoid ibuprofen as an allergy moving forward.  - reviewed with patient warning signs/sxymptoms, if she feels swelling of tongue and/or chest tightness/shortness of breath she is to alert us immediately.    Mohini Jung, DO

## 2025-06-10 NOTE — PAYOR COMM NOTE
--------------  ADMISSION REVIEW     Payor: [facility]  Subscriber #:  [id number]  Authorization Number: [id number]    Admit date: 25  Admit time:  7:13 AM                  History and Physical    H&P signed by Alondra Hampton DO at 2025  9:50 AM    Author: Alondra Hampton DO Service: OB/Gyn Author Type: Physician   Filed: 2025  9:50 AM Date of Service: 2025  7:36 AM Status: Signed   : Alondra Hampton DO (Physician)   OBSTETRICS H&P     Chief Complaint: leaking fluid     HPI: Alisson presents with leaking fluid since 0200 today. Fluid has been clear. Contractions intensifying, now 5/10 intensity. Baby is moving well. No bleeding.      Obstetric History                    OB History    Para Term  AB Living    2       1 0    SAB IAB Ectopic Multiple Live Births                           # Outcome Date GA Lbr Alphonse/2nd Weight Sex Type Anes PTL Lv   2 Current                     1 AB 2011 6w0d       THERAPEUTIC            Birth Comments: eab         Prenatal Issues:   Rubella non-immune  GBS carrier     Vitals      Vitals:     25 0815   BP: 120/80   BP Location: Right arm   Pulse: 77   Resp: 18   Temp: 98.8 °F (37.1 °C)   TempSrc: Oral   Weight: 203 lb (92.1 kg)   Height: 61\"            PHYSICAL EXAM  General: NAD, resting in bed  Chest:symmetric respirations, no increased work of breathing  Abdomen: soft, gravid   SVE 3-4/80/-3 per RN exam   FHT: category I     A/P: Alisson Gonzalez is a 34 year old year old  at 40w0d presenting with SROM.     #Labor  -admission SVE 3-4/80/-3  -start pitocin if contractions space  -ampicillin for GBS ppx   -Pain management: per patient preference          Alondra Hampton DO            Signed by Alondra Hampton DO on 2025  9:50 AM           6/10          Date of Service: 6/10/2025  2:57 AM     Signed         Patient pushing with good effort x2.5hrs now. Fetal station unchanged, now with 1cm of caput.  Reviewed poor prognosis for vaginal delivery though not technically meeting criteria for arrest of descent yet. Offered  delivery, patient amenable.      We discussed risks of  section including bleeding, infection, and injury to surrounding organs including uterus, fallopian tubes, bowel, bladder, ureters, and infant. We discussed risk of hemorrhage and medications that may be used to manage atony. We discussed possibility of needing blood transfusion and associated risks of viral infection and transfusion reaction. We discussed risk of surgical infection and infection-prevention measures. We discussed risk of  hysterectomy if conservative measures fail to control bleeding. All questions answered, consent signed.                   6/10          Date of Service: 6/10/2025  3:18 PM     Signed         Called by nurse for allergic reaction.     Patient and nurse noted a rash on her chest and face. Some welts and itching.  The patient had french fries and grilled cheese for lunch. Reports no history of any medication or food allergies.     The only medication patient received recently was IV toradol, which she had also received this morning with no reaction.     Nurse gave IV bendaryl.     On my exam, several raised, welts on chin and chest.   Abdomen with no lesions. Tegaderm c/d/I, no erythema, no welts or other rash near incision site.     A/P:  Allergic reaction to unknown substance  - IV pepcid now to decrease histamine response.  - dc toradol, trial PO ibuprofen with close monitoring for rash after taking. If rash, then needs to avoid ibuprofen as an allergy moving forward.  - reviewed with patient warning signs/sxymptoms, if she feels swelling of tongue and/or chest tightness/shortness of breath she is to alert us immediately.     Mohini Jung, DO                         MEDICATIONS ADMINISTERED IN LAST 1 DAY:  ampicillin (Omnipen) 1 g in sodium chloride 0.9% 100mL IVPB-VILMA       Date  Action Dose Route User    6/10/2025 0033 New Bag 1 g Intravenous Chika Joseph RN    6/9/2025 2023 New Bag 1 g Intravenous Chika Joseph RN    6/9/2025 1622 New Bag 1 g Intravenous Zaida Centeno RN          azithromycin (Zithromax) 500 mg in sodium chloride 0.9% 250mL IVPB premix       Date Action Dose Route User    6/10/2025 0322 Given 500 mg Intravenous Wendy Walton MD          ceFAZolin (Ancef) 2g in 10mL IV syringe premix       Date Action Dose Route User    6/10/2025 0322 Given 2 g Intravenous Wendy Walton MD          diphenhydrAMINE (Benadryl) 50 mg/mL  injection 12.5 mg       Date Action Dose Route User    6/10/2025 1503 Given 12.5 mg Intravenous Lila Berry RN          famotidine (Pepcid) 20 mg/2mL injection 20 mg       Date Action Dose Route User    6/10/2025 0259 Given 20 mg Intravenous Chika Joseph RN          famotidine (Pepcid) 20 mg/2mL injection       Date Action Dose Route User    6/10/2025 0259 Given 20 mg Intravenous Chika Joseph RN          famotidine (Pepcid) 20 mg/2mL injection 20 mg       Date Action Dose Route User    6/10/2025 1524 Given 20 mg Intravenous Lila Berry RN          fentaNYL-bupivacaine 2 mcg/mL-0.125% in sodium chloride 0.9% 100 mL EPIDURAL infusion premix       Date Action Dose Route User    6/9/2025 1723 New Bag 200 mcg Epidural Zaida Centeno RN          fentaNYL (Sublimaze) 50 mcg/mL injection       Date Action Dose Route User    6/10/2025 0352 Given 50 mcg Intravenous Wendy Walton MD          ketorolac (Toradol) 30 MG/ML injection 30 mg       Date Action Dose Route User    6/10/2025 1214 Given 30 mg Intravenous Lila Berry RN          ketorolac (Toradol) 30 MG/ML injection 30 mg       Date Action Dose Route User    6/10/2025 0558 Given 30 mg Intravenous Chika Joseph RN          lactated ringers infusion       Date Action Dose Route User    6/9/2025 1930 New Bag (none) Intravenous Chika Joseph, RN          lactated ringers infusion        Date Action Dose Route User    6/10/2025 0315 New Bag (none) Intravenous Wendy Walton MD          lactated ringers infusion       Date Action Dose Route User    6/10/2025 0528 New Bag (none) Intravenous Cihka Joseph RN          lidocaine 2%-EPINEPHrine 1:200,000 (Xylocaine-Epinephrine) injection       Date Action Dose Route User    6/10/2025 0321 Given 5 mL Epidural Wendy Walton MD    6/10/2025 0317 Given 10 mL Epidural Wendy Walton MD          metoclopramide (Reglan) 5 mg/mL injection 10 mg       Date Action Dose Route User    6/10/2025 0259 Given 10 mg Intravenous Chika Joseph RN          metoclopramide (Reglan) 5 mg/mL injection       Date Action Dose Route User    6/10/2025 0259 Given 10 mg Intravenous Chika Joseph RN          morphINE (PF) 1 MG/ML injection       Date Action Dose Route User    6/10/2025 0349 Given 2 mg Epidural Wendy Walton MD          oxyTOCIN in sodium chloride 0.9% (Pitocin) 30 Units/500mL infusion premix       Date Action Dose Route User    6/10/2025 0334 New Bag 300 mL/hr Intravenous Wendy Walton MD          oxyTOCIN in sodium chloride 0.9% (Pitocin) 30 Units/500mL infusion premix       Date Action Dose Route User    6/10/2025 0131 Rate/Dose Change 6 carlos-units/min Intravenous Chika Joseph RN    6/10/2025 0055 Rate/Dose Change 4 carlos-units/min Intravenous Chika Joseph RN    6/9/2025 2145 Rate/Dose Change 2 carlos-units/min Intravenous Chika Joseph RN    6/9/2025 2115 Restarted 1 carlos-units/min Intravenous Chika Joseph RN          oxyTOCIN in sodium chloride 0.9% (Pitocin) 30 Units/500mL infusion premix       Date Action Dose Route User    6/10/2025 0437 Rate/Dose Change 62.5 carlos-units/min Intravenous Chika Joseph RN          sodium citrate-citric acid (Bicitra) 500-334 MG/5ML oral solution 30 mL       Date Action Dose Route User    6/10/2025 0253 Given 30 mL Oral Chika Joseph, RN          sodium citrate-citric acid (Bicitra) 500-334 MG/5ML oral  solution       Date Action Dose Route User    6/10/2025 0253 Given 30 mL Oral Chika Joseph RN          sodium chloride 0.9% infusion       Date Action Dose Route User    6/9/2025 2251 New Bag (none) Intrauterine (Uterus) Chika Joseph RN    6/9/2025 1945 Rate/Dose Change (none) Intrauterine (Uterus) Chika Joseph RN          sodium chloride 0.9 % IV bolus 500 mL       Date Action Dose Route User    6/9/2025 1917 New Bag 500 mL Intracatheter Zaida Centeno RN          tranexamic acid in sodium chloride 0.7% (Cyklokapron) 1000 mg/100mL infusion premix       Date Action Dose Route User    6/10/2025 0330 Given 1,000 mg Intravenous Wendy Walton MD            Vitals (last day)       Date/Time Temp Pulse Resp BP SpO2 Weight O2 Device O2 Flow Rate (L/min) [facility]    06/10/25 1220 98.4 °F (36.9 °C) 87 16 124/74 98 % -- -- -- ES    06/10/25 0845 98.5 °F (36.9 °C) 89 16 113/71 95 % -- -- -- ES    06/10/25 0642 98.9 °F (37.2 °C) 92 20 103/74 96 % -- None (Room air) -- KJ    06/10/25 0600 -- 98 16 121/75 95 % -- -- -- KJ    06/10/25 0545 -- 100 18 116/94 94 % -- -- -- KJ    06/10/25 0530 -- 101 26 119/42 94 % -- -- -- KJ    06/10/25 0515 -- 104 12 117/60 96 % -- -- -- KJ    06/10/25 0500 -- 102 26 119/56 97 % -- -- -- KJ    06/10/25 0445 -- 99 23 119/56 99 % -- -- -- KJ    06/10/25 0430 -- 90 20 122/70 -- -- None (Room air) -- KJ    06/10/25 0415 -- 70 18 97/60 -- -- None (Room air) -- KJ    06/10/25 0015 -- 69 -- 146/85 100 % -- -- -- KJ    06/10/25 0000 -- 69 -- 121/74 100 % -- -- -- KJ    06/09/25 2345 -- 70 -- 130/65 100 % -- -- -- KJ    06/09/25 2330 -- 67 -- 123/65 100 % -- -- -- KJ    06/09/25 2315 -- 70 -- 104/73 100 % -- -- -- KJ    06/09/25 2310 -- 68 -- 119/74 100 % -- -- -- KJ    06/09/25 2230 -- 69 -- 130/77 99 % -- -- -- KJ    06/09/25 2215 98 °F (36.7 °C) 74 -- 130/64 100 % -- -- -- KJ    06/09/25 2200 -- 73 -- 140/70 100 % -- -- -- KJ    06/09/25 2145 -- 67 -- 136/67 100 % -- -- -- KJ    06/09/25 2130 --  68 -- 142/75 -- -- -- --     06/09/25 2000 -- 65 -- 120/102 100 % -- -- -- KJ    06/09/25 1945 -- 65 -- 147/74 100 % -- -- --     06/09/25 1930 99.3 °F (37.4 °C) 66 20 136/78 100 % -- None (Room air) -- KJ    06/09/25 1915 -- 65 -- 118/76 100 % -- -- -- MB    06/09/25 1900 -- 67 -- 113/62 100 % -- -- -- MB    06/09/25 1830 -- 82 -- 139/78 100 % -- -- -- MB    06/09/25 1815 99.4 °F (37.4 °C) 60 16 136/78 100 % -- -- -- MB    06/09/25 1800 -- 70 -- 141/87 98 % -- -- -- MB    06/09/25 1745 -- 65 -- 136/84 99 % -- -- -- MB    06/09/25 1730 -- 65 -- 115/65 99 % -- -- -- MB    06/09/25 1715 -- 67 16 115/65 99 % -- -- -- MB    06/09/25 1700 -- 66 -- 144/92 100 % -- -- -- MB    06/09/25 1645 -- 68 -- 122/58 100 % -- -- -- MB    06/09/25 1630 -- 69 -- 107/42 100 % -- -- -- MB    06/09/25 1615 98.9 °F (37.2 °C) 82 18 116/47 100 % -- -- -- MB    06/09/25 1545 -- 72 -- 138/86 98 % -- -- -- MB    06/09/25 1530 -- 63 -- 133/82 99 % -- -- -- MB    06/09/25 1515 -- 62 16 120/67 100 % -- -- -- MB    06/09/25 1500 -- 65 -- 102/56 99 % -- -- -- MB    06/09/25 1445 -- 62 -- 116/63 100 % -- -- -- MB    06/09/25 1415 98.2 °F (36.8 °C) 73 16 118/75 98 % -- -- -- MB    06/09/25 1400 -- 72 -- 118/81 98 % -- -- -- MB    06/09/25 1345 -- 73 -- 137/87 99 % -- -- -- MB    06/09/25 1330 -- 72 -- 124/70 100 % -- -- -- MB    06/09/25 1315 -- -- 16 -- -- -- -- -- MB    06/09/25 1230 -- 74 -- 117/53 100 % -- -- -- MB    06/09/25 1216 -- 69 -- 105/62 100 % -- -- -- MB    06/09/25 1215 98.8 °F (37.1 °C) 69 16 105/62 100 % -- -- -- MB    06/09/25 1200 -- 73 -- 89/62 100 % -- -- -- MB    06/09/25 1145 -- 73 -- 115/62 100 % -- -- -- MB    06/09/25 1130 -- 70 -- 116/59 100 % -- -- -- MB    06/09/25 1115 -- 86 18 125/108 100 % -- -- -- MB    06/09/25 1100 -- 68 -- 121/71 100 % -- -- -- MB    06/09/25 1030 -- 66 -- 118/87 100 % -- -- -- MB    06/09/25 1015 98.2 °F (36.8 °C) 72 16 122/64 99 % -- -- -- MB    06/09/25 1001 -- 71 -- 123/64 99 % -- -- -- MB     06/09/25 0956 -- 77 -- 117/68 99 % -- -- -- MB    06/09/25 0954 -- 74 -- 125/68 -- -- -- -- MB    06/09/25 0951 -- 74 -- 127/71 100 % -- -- -- MB    06/09/25 0949 -- 68 -- 127/72 -- -- -- -- MB    06/09/25 0946 -- 71 -- 102/81 100 % -- -- -- MB    06/09/25 0815 98.8 °F (37.1 °C) 77 18 120/80 -- 203 lb (92.1 kg) None (Room air) -- RP

## 2025-06-10 NOTE — DISCHARGE INSTRUCTIONS
[facility] has great support for our families even after discharge.  We have virtual or in-person support groups.  Visit our website for the most up-to-date info for our many different support groups. [email or web address]       Outpatient Lactation appointments:  Call [phone]- to schedule an appt.  Our office is located in the Maternal Fetal Medicine office next to [facility] on the first floor.        Support Groups: Moms-to-be are also welcome! All mom's welcome even if its not your first.     MOM & BABY HOUR- Every new mom can use some support in the exciting but challenging adjustment to motherhood. Join us for Mom and Baby Hour where an experienced nurse and lactation consultant will guide conversations and answer questions and provide breastfeeding support.  Most weeks we will also have a guest speaker to present information on many different parenting topics. We welcome mothers and their infants (up to crawling), dad, grandmas, and others to join our group.    Meets most  10:00 - 11:30 a.m.  Masks are not required, but be considerate of others and do not attend if mom or baby have had any symptoms of illness within the previous 24 hours. Location [facility] [place] [address] [place] Go inside the front door and to the right to the “Community Education Room”.      Nurturing Mom- A support group for new and expectant moms looking for support with the transition to parenthood as well as those experiencing symptoms of  anxiety and/or depression.  Please contact [email or web address] if you need directions or the link for the virtual meetings. Please contact [email or web address] if you plan to attend, but please be considerate of others and do not attend if mom or baby have had any symptoms of illness within the previous 24 hours.       Mom's Line: [phone]  A phone line dedicated for women (or anyone  worried about a women) who may be experiencing signs or symptoms of postpartum depression, anxiety, or overwhelmed with new baby. Call 24/7- answered by live trained mental health professionals, free and confidential, emotional support, referrals, in any language.    La Leche League for breast feeding and parent support, Website: IIIi.org  and for the [place] group and other groups visit [email or web address]    Facebook groups-  for more support when home- Babies & Mommies [facility] --- you can find mom-to-mom advice and the list of speaker topics for cradle talk program.  Telephone Support  Postpartum depression [place] (www.ppdil.org)  -Free information and support for pregnant and postpartum women with symptoms of depression, anxiety  -Mom-to-mom support from volunteers who have been through depression    Telephone support: [phone]  Urgent support:([phone] (answered 24/7)  Email support: support@ppdil.org    Postpartum Support International (www.postpartum.net)  -Free phone conversation with trained volunteers   ([phone] 477-4148; after the prompt enter 22108#    National Postpartum Depression Hotline  (106)-PPD-MOMS    Helpful websites:    www.llli.org  www.zePASS  www.Breastfeed[place].org    Initiation of breast pumping after discharge:     - Add 1 pumping session a day, additional to the infant's feedings, 2-3 weeks after delivery.     - Pump both breasts for 15 mins, immediately after a breast feeding session.    - Pumping first thing in the morning will provide greater output.    - If you chose to pump more than once a day, you should be consistent every day to prevent a breast infection.      - Pump using an electric pump over a hands free pump (electric pumps provided stronger stimulation to the nipples).    - Store the breast milk in 2-3 oz containers.     - Label containers with date and time.    - Always feed oldest milk first.            Other Discharge Instructions:           Post  Section Home Care Instructions   Pelvic rest for 6 weeks. No sex, tampons, baths or swimming. May shower.  No heavy lifting, nothing greater then 10-15 pounds.  No strenuous activity.  No driving for at least 2 weeks and you are no longer taking narcotics (norco).  Do not soak your incision/wound. Look at incision at least 2 times a day. If any signs/symptoms of infection, let OB office know (redness, swelling, pain, foul smelling odor or discharge).  Call MD for any questions or concerns, temp over 100.4, increased pain, increased bleeding, foul smelling odor/discharge from vagina/incision or signs of postpartum depression.      We hope you were pleased with your care at [facility].  We wish you the best outcome and overall experience with the delivery of your baby.  These instructions will help to minimize pain, limit the risk for an infection, and improve the likelihood of a successful recovery.    What to Expect:  Abdominal cramping after delivery especially if you are breastfeeding.   Vaginal bleeding for about 4-6 weeks that may be followed by a yellow or white discharge for a few more weeks.  Your period will resume in approximately 6-8 weeks, unless you are breastfeeding.    If you are bottle feeding, you may notice breast engorgement in about 3 days.  Your breast may be sore and hard. Please wear a tight fitted bra or sports bra for 24-36 hours to help prevent your breast from producing milk, and use ice packs to relive any discomfort.  If you are breastfeeding, nipple dryness is very common the first few days.    Constipation is common after having a baby.  Please increase fluid and fiber in your diet.      Over-The-Counter Medication  Non-prescription anti-inflammatory medications can also help to ease the pain.  You may take Aleve, Tylenol or Ibuprofen   Colace or Metamucil for Constipation  Lanolin for dry nipples  Tucks,  Witch Hazel and Epifoam for vaginal/perineum discomfort.   Drink a full glass of water with oral medication and take as directed.    Wound Care  The following instructions will promote proper healing and help to prevent infection  Please use soap and water over incision   Pat your incision dry and leave open to air if possible   If you have steri - strips, then please remove after 4-5 days from your surgery. You may remove after a shower to decrease discomfort.   Do not replace the Steri-Strips, if they come off.  If the tapes come off, leave them off and keep the incision clean.  You do not need to cover the incision or put any medications on the incision.    Bathing/Showers  You may resume showers  No baths, swimming, hot tubs until your post-partum visit    Home Medication  Resume your home medications as instructed    Diet  Resume your normal diet    Activity  Refrain from vaginal intercourse, vaginal suppositories, tampon use or douches until after your post-partum visit  No exercising for 4 weeks  You may climb stairs minimally for the 1st week.    Do not do heavy housework for at least 2-3 weeks    Return to Work or School  You may return to work in 6-8 weeks  Contact your obstetrician’s office, if you need a medical release. ([phone])    Driving  Avoid driving for 1-2 weeks or sooner if not taking narcotics.    Follow-up Appointment with Your Obstetrician  Call your obstetrician’s office today for an appointment in four weeks.    The number is [phone].  Verify your appointment date, day, time, and location.  At your 1st post-partum office visit:  Your progress will be evaluated, findings reviewed, and any additional concerns and instructions will be discussed.    Questions or Concerns  Call your obstetrician’s office if you experience the following:  Severe pain not controlled by pain medication  Foul smelling vaginal discharge  Heavy bleeding  Shortness of breath  Fever  Redness, increased  swelling or drainage from your incision  Crying and periods of sadness that prevents you from caring for yourself and your baby  Burning sensation during urination or inability to urinate  Swelling, redness or abnormal warmth to your leg/calf  Please call [phone]. If your call is made after office hours, a physician will be available to help you.  There is always a provider covering our patients.    Thank you for coming to [facility] to start your new family.  The nurses, obstetricians, and the anesthesiologists try very hard to make sure you receive the best care possible.  Your trust in them as well as us is greatly appreciated.    Thanks so much,   The Providers of [facility] Obstetrics and Gynecology

## 2025-06-10 NOTE — LACTATION NOTE
This note was copied from a baby's chart.  LACTATION NOTE - INFANT    Evaluation Type  Evaluation Type: Inpatient    Problems & Assessment  Problems Diagnosed or Identified: Sleepy  Infant Assessment: Minimal hunger cues present  Muscle tone: Appropriate for GA    Feeding Assessment  Summary Current Feeding: Breastfeeding exclusively  Breastfeeding Assessment: Assisted with breastfeeding w/mother's permission, Pulling on nipple, Needs pacing, Calm and ready to breastfeed  Breastfeeding Positions: laid back, cradle, right breast, left breast  Latch: Repeated attempts, hold nipple in mouth, stimulate to suck  Audible Sucks/Swallows: None  Type of Nipple: Everted (after stimulation)  Comfort (Breast/Nipple): Soft/non-tender  Hold (Positioning): Full assist, staff holds infant at breast  LATCH Score: 5

## 2025-06-11 LAB
BASOPHILS # BLD AUTO: 0.03 X10(3) UL (ref 0–0.2)
BASOPHILS NFR BLD AUTO: 0.3 %
DEPRECATED RDW RBC AUTO: 47.8 FL (ref 35.1–46.3)
EOSINOPHIL # BLD AUTO: 0.23 X10(3) UL (ref 0–0.7)
EOSINOPHIL NFR BLD AUTO: 2 %
ERYTHROCYTE [DISTWIDTH] IN BLOOD BY AUTOMATED COUNT: 14 % (ref 11–15)
HCT VFR BLD AUTO: 24.1 % (ref 35–48)
HGB BLD-MCNC: 8.4 G/DL (ref 12–16)
IMM GRANULOCYTES # BLD AUTO: 0.06 X10(3) UL (ref 0–1)
IMM GRANULOCYTES NFR BLD: 0.5 %
LYMPHOCYTES # BLD AUTO: 2.19 X10(3) UL (ref 1–4)
LYMPHOCYTES NFR BLD AUTO: 19.2 %
MCH RBC QN AUTO: 33.1 PG (ref 26–34)
MCHC RBC AUTO-ENTMCNC: 34.9 G/DL (ref 31–37)
MCV RBC AUTO: 94.9 FL (ref 80–100)
MONOCYTES # BLD AUTO: 0.92 X10(3) UL (ref 0.1–1)
MONOCYTES NFR BLD AUTO: 8 %
NEUTROPHILS # BLD AUTO: 8 X10 (3) UL (ref 1.5–7.7)
NEUTROPHILS # BLD AUTO: 8 X10(3) UL (ref 1.5–7.7)
NEUTROPHILS NFR BLD AUTO: 70 %
PLATELET # BLD AUTO: 169 10(3)UL (ref 150–450)
RBC # BLD AUTO: 2.54 X10(6)UL (ref 3.8–5.3)
WBC # BLD AUTO: 11.4 X10(3) UL (ref 4–11)

## 2025-06-11 PROCEDURE — 59514 CESAREAN DELIVERY ONLY: CPT | Performed by: OBSTETRICS & GYNECOLOGY

## 2025-06-11 NOTE — LACTATION NOTE
06/11/25 1505   Evaluation Type   Evaluation Type Inpatient   Problems identified   Problems identified Knowledge deficit   Maternal history   Maternal history Caesarean section;Polycystic ovarian syndrome (PCOS)   Breastfeeding goal   Breastfeeding goal To maintain breast milk feeding per patient goal   Maternal Assessment   Bilateral Breasts Wide spaced;Tubular shaped;Soft   Bilateral Nipples Slightly everted/short;Colostrum easily expressed   Prior breastfeeding experience (comment below) Primip   Pain assessment   Location/Comment denies   Treatment of Sore Nipples Deeper latch techniques;Expressed breast milk   Guidelines for use of:   Current use of pump: Not currently indicated   Other (comment) LC assistance offered.  Mother states that feeding has been going okay. Struggles more with the right side then the left side. Infant was just recently circumcised and is not interested in eating at this time. LC educated on feeding patterns of a baby post circumcision. LC also educated on feeding patterns of a baby over 24 hours old. LC reviewed cluster feeding behaviors. All questions answered.

## 2025-06-11 NOTE — PROGRESS NOTES
[facility]  Obstetrics and Gynecology    OB/GYN: Postpartum Progress Note     SUBJECTIVE:  Patient is a 34 year old  female who is s/p PLTCS. She is POD# 1.   Doing well. Noted no c/o. Denies fever, chills, N, V, chest pain and SOB. Bleeding has been stable.  Voiding without difficulty.  Passing flatus.  Tolerating general diet. Ambulating without difficulty.     OBJECTIVE:  Vitals:    06/10/25 2045 25 0122 25 0913 25 0914   BP: 111/70 113/82  126/81   Pulse: 98 95  78   Resp: 16 16  15   Temp: 98.7 °F (37.1 °C) 98.5 °F (36.9 °C) 97.9 °F (36.6 °C)    TempSrc: Oral Oral Oral    SpO2:       Weight:       Height:           Physical Exam:  Lungs:   Respirations non labored   Cardiovascular:   Peripheral pulses +2 bilaterally      General:    AAA. NAD.    Abdomen Soft, nontender, nondistended,    Lochia:  appropriate   Uterine Fundus:   firm at the umbilicus   Incision:  healing well, no significant drainage, no dehiscence, no significant erythema with Tegaderm in place    DVT Evaluation:  No evidence of DVT seen on physical exam.  Negative Bandar's sign.  No cords or calf tenderness.  No significant calf/ankle edema.      Labs:  Recent Labs   Lab 25  0610   RBC 2.54*   HGB 8.4*   HCT 24.1*   MCV 94.9   MCH 33.1   MCHC 34.9   RDW 14.0   NEPRELIM 8.00*   WBC 11.4*   .0       ASSESSMENT/PLAN:  Patient is a 34 year old  female who is s/p PLTCS POD# 1.     Doing well   Continue routine postpartum care  Vitals per routine   Encourage ambulation and IS use   Plan for discharge to home likely pod #2 or 3    The patient's mother desires circumcision.   Mother understands there is no medical indication for circumcision. We discussed AAP opinion on procedure as well. She was consented for infant circumcision risks including, but not limited to: bleeding, infection, trauma to other tissue, and need for further procedures.  The mother expressed understanding, questions  were answered and she  wishes to proceed with the procedure for her son.    MD KATHERIN Solis

## 2025-06-12 NOTE — PROGRESS NOTES
[facility]  Obstetrics and Gynecology    OB/GYN: Postpartum Progress Note     SUBJECTIVE:  Patient is a 34 year old  female who is s/p PLTCS. She is POD# 2.   Doing well. Noted no complaints except some incisional soreness. Denies fever, chills, N, V, chest pain and SOB. Bleeding has been stable.  Voiding without difficulty.  Passing flatus.  No Bm. Tolerating general diet. Ambulating without difficulty.     OBJECTIVE:  Vitals:    25 0913 25 0914 25 2045 25 0823   BP:  126/81 135/77 122/74   Pulse:  78 63 61   Resp:  15 12 15   Temp: 97.9 °F (36.6 °C)  98.4 °F (36.9 °C) 98.2 °F (36.8 °C)   TempSrc: Oral  Oral Oral   SpO2:       Weight:       Height:           Physical Exam:  Lungs:   Respirations non labored   Cardiovascular:   Peripheral pulses +2 bilaterally      General:    AAA. NAD.    Abdomen Soft, nontender, nondistended   Lochia:  appropriate   Uterine Fundus:   firm at the umbilicus   Incision:  healing well, no significant drainage, no dehiscence, no significant erythema with Tegaderm in place    DVT Evaluation:  No evidence of DVT seen on physical exam.  No cords or calf tenderness.  Significant calf/ankle edema.     Urinary output is adequate. (When recorded)    Labs:  Recent Labs   Lab 25  0610   RBC 2.54*   HGB 8.4*   HCT 24.1*   MCV 94.9   MCH 33.1   MCHC 34.9   RDW 14.0   NEPRELIM 8.00*   WBC 11.4*   .0       ASSESSMENT/PLAN:  Patient is a 34 year old  female who is s/p PLTCS POD# 2.     Doing well   Continue routine postpartum care  Vitals per routine   Encourage ambulation and IS use   IV iron 1 dose today and tomorrow.   Plan for discharge to home tomorrow.   Follow up in 2&6 weeks        Dr. Ap Kowalski MD    [facility]     This note was created by "[employer]" voice recognition. Errors in content may be related to improper recognition by the system; efforts to review and correct have been done but errors may still exist. Please  be advised the primary purpose of this note is for me to communicate medical care. Standard sentence structure is not always used. Medical terminology and medical abbreviations may be used. There may be grammatical, typographical, and automated fill ins that may have errors missed in proofreading.

## 2025-06-12 NOTE — LACTATION NOTE
This note was copied from a baby's chart.     06/12/25 2170   Evaluation Type   Evaluation Type Inpatient   Problems & Assessment   Problems Diagnosed or Identified Latch difficulty   Infant Assessment Hunger cues present;Skin color: jaundice;Skin color: pink or appropriate for ethnicity   Muscle tone Appropriate for GA   Feeding Assessment   Summary Current Feeding Breastfeeding exclusively   Last 24 hour feeding summary No void in 24 hours   Breastfeeding Assessment Assisted with breastfeeding w/mother's permission;Fussy infant, unable to sustain suckling to breast;No sustained latch to breast;PO supplement followed breastfeeding   Breastfeeding Positions cross cradle;right breast   Latch 0   Audible Sucks/Swallows 1   Type of Nipple 2   Comfort (Breast/Nipple) 2   Hold (Positioning) 1   LATCH Score 6   Other (comment) Attempted latch on R breast in cross cradle.  Able to obtain a deep latch but not sustained.  Mother unable to express colostrum with this feeding.  Parents feel infant is hungry.  No void in 24 hours.  Paced bottle feeding shown.  Support and encouragement provided.   Pre/Post Weights   Supplement Type Formula   Supplement Type (other) Gentlease   Supplement total, ml 18   Equipment used   Equipment used Bottle with slow flow nipple

## 2025-06-12 NOTE — LACTATION NOTE
06/12/25 1330   Evaluation Type   Evaluation Type Inpatient   Problems identified   Problems identified Knowledge deficit   Maternal history   Maternal history Caesarean section;Polycystic ovarian syndrome (PCOS)   Breastfeeding goal   Breastfeeding goal To maintain breast milk feeding per patient goal   Maternal Assessment   Bilateral Breasts Wide spaced;Tubular shaped;Soft   Bilateral Nipples Slightly everted/short;Colostrum difficult to express   Prior breastfeeding experience (comment below) Primip   Breastfeeding Assistance Breastfeeding assistance provided with permission;Breast exam provided with permission;Hand expression provided with permission   Pain assessment   Location/Comment denies   Treatment of Sore Nipples Deeper latch techniques;Expressed breast milk;Lanolin   Guidelines for use of:   Equipment Lanolin   Breast pump type Ameda Platinum  (Mom states she has an electric pump for home)   Current use of pump: occasional   Suggested use of pump Pump if infant is not latching to breast;Pump each time a supplement is offered   Other (comment) Assisted with latch on the R side in cross cradle.  Able to obtain a deep latch but not sustained.  Parents feel infant is hungry.  Infant has not voided in 24 hours.  Weight loss and bilirubin level increasing. Paced bottle feeding shown.  Supplementation guide explained.  Support and encouragement given.     Reviewed hand expression, breast massage/compression, deeper latch techniques, skin to skin benefits, typical I and O of infant, and waking techniques.  Encouraged skin to skin and offering the breast before supplementation. Pumping guidelines reviewed. Encouraged to reach out with further questions.

## 2025-06-13 VITALS
SYSTOLIC BLOOD PRESSURE: 124 MMHG | OXYGEN SATURATION: 98 % | TEMPERATURE: 98 F | HEIGHT: 61 IN | RESPIRATION RATE: 15 BRPM | HEART RATE: 65 BPM | BODY MASS INDEX: 38.33 KG/M2 | WEIGHT: 203 LBS | DIASTOLIC BLOOD PRESSURE: 81 MMHG

## 2025-06-13 RX ORDER — FERROUS SULFATE 325(65) MG
325 TABLET, DELAYED RELEASE (ENTERIC COATED) ORAL
Qty: 30 TABLET | Refills: 0 | Status: SHIPPED | OUTPATIENT
Start: 2025-06-13

## 2025-06-13 RX ORDER — DOCUSATE SODIUM 100 MG/1
100 CAPSULE, LIQUID FILLED ORAL 2 TIMES DAILY
Qty: 30 CAPSULE | Refills: 0 | Status: SHIPPED | OUTPATIENT
Start: 2025-06-13

## 2025-06-13 RX ORDER — GABAPENTIN 300 MG/1
300 CAPSULE ORAL EVERY 8 HOURS PRN
Qty: 30 CAPSULE | Refills: 11 | Status: SHIPPED | OUTPATIENT
Start: 2025-06-13

## 2025-06-13 RX ORDER — IBUPROFEN 600 MG/1
600 TABLET, FILM COATED ORAL EVERY 6 HOURS PRN
Qty: 30 TABLET | Refills: 0 | Status: SHIPPED | OUTPATIENT
Start: 2025-06-13

## 2025-06-13 NOTE — PROGRESS NOTES
Pain well controlled. Voiding freely, Tolerating general diet.     Patient Vitals for the past 24 hrs:   BP Temp Temp src Pulse Resp   06/12/25 2135 126/78 98.7 °F (37.1 °C) Oral 67 16   06/12/25 0823 122/74 98.2 °F (36.8 °C) Oral 61 15   ABD: wound cdi, FF  EXT: no calf tenerness    POD#3 c/s for FTP. Doing well. DW her discharge instructions.

## 2025-06-13 NOTE — DISCHARGE SUMMARY
[facility]  part of [facility]    Discharge Summary    Alisson Gonzalez Patient Status:  Inpatient    1990 MRN [id number]   Location [facility] 3SE Attending Adela Hampton DO   Hosp Day # 4 PCP None Pcp     Date of Admission: 2025    Admission Diagnoses: pregnancy  Pregnancy (HCC)    Date of Discharge: 2025    Discharge Diagnoses:S/P  Section    Episode Diagnoses:   Pregnancy  Problems (from 24 to present)       Problem Noted Diagnosed Resolved    Rubella non-immune status, antepartum (HCC) 2024 by Ap Kowalski MD  No    Genetic carrier status 2024 by Mishel Daniels RN  2025 by Rosalina Owens MD    Overview Signed 2024  3:32 PM by Mishel Daniels RN   Negative foresight carrier screen-2024                       Hospital Course:     EDC: Estimated Date of Delivery: 25    Gestational Age: 40w1d    Date of Delivery: 6/10/2025  Time of Delivery: 3:33 AM    Antepartum complications: none    Delivered By: ADELA HAMPTON    Delivery Method: Caesarean Section    Delivery Procedures:   Surgical Procedures       Case IDs Date Procedure Surgeon Location Status    [id number] 6/10/25  SECTION Adela Hampton DO [facility] L+D OR Comp            Baby: male      Apgars:  1 minute:   8                 5 minutes: 9                          10 minutes:      Feeding Method:The patient is currently breastfeeding.     Intrapartum Complications: Failure to Progress    Lacerations      Perineal lacerations: None      Vaginal laceration?: No      Cervical laceration?: No    Clitoral laceration?: No             Episiotomy: None    Placenta: Expressed    Postpartum complications: Anemia                  Discharge Plan:   Discharge Condition: Good    Discharge medications:  Current Discharge Medication List        New Orders    Details   ibuprofen 600 MG Oral Tab Take 1 tablet (600 mg total) by mouth every 6 (six) hours as needed for Pain (for  pain).      docusate sodium 100 MG Oral Cap Take 1 capsule (100 mg total) by mouth 2 (two) times daily.      Acetaminophen 500 MG Oral Cap Take 2 capsules (1,000 mg total) by mouth every 6 (six) hours as needed for Pain.      ferrous sulfate 325 (65 FE) MG Oral Tab EC Take 1 tablet (325 mg total) by mouth daily with breakfast.           Home Meds - Unchanged    Details   Prenatal Vit-Fe Fumarate-FA (MULTI PRENATAL) 27-0.8 MG Oral Tab Take by mouth.                   Discharge Diet: As tolerated and General diet    Discharge Activity: Pelvic rest until cleared and As tolerated    Follow up:     Follow Up in the Office: 2 weeks for follow up     Follow-up Information       [facility] Lactation Services. Call.    Specialty: Pediatrics  Why: As needed  Contact information:  [address]  [facility] [address] [phone]  Additional information:  Masks are optional for all patients and visitors, unless otherwise indicated.             Alondra Hampton, DO Follow up in 2 week(s).    Specialty: OBSTETRICS & GYNECOLOGY  Why: Incision check  Contact information:  [address] [address]  [phone]               Alondra Hampton, DO Follow up in 6 week(s).    Specialty: OBSTETRICS & GYNECOLOGY  Why: Post Partum appointment  Contact information:  [address] [place] [address]  [phone]               [facility] Lactation Services. Call.    Specialty: Pediatrics  Why: As needed  Contact information:  [address] [place] [address] [phone]  Additional information:  Your appointment is scheduled at [facility] Breastfeeding Center - [address] 2nd floor [facility] parking is available for the Breastfeeding Center in parking lot B off of [address]. Parking lot B is the parking lot closest to the Breastfeeding Center building.      What to Bring to your Appointment:      Referrals-   If your insurance requires a referral,  you will need a referral from your OB physician for yourself and a referral from your baby’s physician for baby.      Fax referrals to the Breastfeeding Center at [phone]   Baby and All Supplies-   Adjust your feeding schedule on the day of your appointment so baby is hungry at the time of your appointment. This usually means to NOT feed for at least 2 - 3 hours prior to your appointment      Please bring ALL equipment you are using (such as a breast pump, pump parts, nipple shield, etc).      If you are supplementing your baby, bring enough expressed breastmilk or formula for a full feeding, and the method you are using for feeding your baby.      Masks are optional for all patients and visitors, unless otherwise indicated.                                 Other Discharge Instructions:         [facility] has great support for our families even after discharge.  We have virtual or in-person support groups.  Visit our website for the most up-to-date info for our many different support groups. [email or web address]       Outpatient Lactation appointments:  Call [phone]- to schedule an appt.  Our office is located in the Maternal Fetal Medicine office next to [facility] on the first floor.        Support Groups: Moms-to-be are also welcome! All mom's welcome even if its not your first.     MOM & BABY HOUR- Every new mom can use some support in the exciting but challenging adjustment to motherhood. Join us for Mom and Baby Hour where an experienced nurse and lactation consultant will guide conversations and answer questions and provide breastfeeding support.  Most weeks we will also have a guest speaker to present information on many different parenting topics. We welcome mothers and their infants (up to crawling), dad, grandmas, and others to join our group.    Meets most Wednesdays 10:00 - 11:30 a.m.  Masks are not required, but be considerate of others and do not  attend if mom or baby have had any symptoms of illness within the previous 24 hours. Location [facility] [place] [address] [facility] [place] Go inside the front door and to the right to the “Community Education Room”.      Nurturing Mom- A support group for new and expectant moms looking for support with the transition to parenthood as well as those experiencing symptoms of  anxiety and/or depression.  Please contact [email or web address] if you need directions or the link for the virtual meetings. Please contact [email or web address] if you plan to attend, but please be considerate of others and do not attend if mom or baby have had any symptoms of illness within the previous 24 hours.       Mom's Line: [phone]  A phone line dedicated for women (or anyone worried about a women) who may be experiencing signs or symptoms of postpartum depression, anxiety, or overwhelmed with new baby. Call - answered by live trained mental health professionals, free and confidential, emotional support, referrals, in any language.    La Leche League for breast feeding and parent support, Website: [employer]  and for the [place] group and other groups visit https://www.facebook.com/pg/Man/events/    Facebook groups-  for more support when home- [facility]ies [facility] Momm[facility] --- you can find mom-to-mom advice and the list of speaker topics for cradle talk program.  Telephone Support  Postpartum depression [place] [facility] [place] (www.ppdil.org)  -Free information and support for pregnant and postpartum women with symptoms of depression, anxiety  -Mom-to-mom support from volunteers who have been through depression    Telephone support: ([phone]  Urgent support:(019)-768-1967 (answered )  Email support: support@ppdil.org    Postpartum Support International (www.postpartum.net)  -Free phone conversation with trained volunteers   (492) 781-6713; after the prompt enter  28835#    National Postpartum Depression Hotline  (201)-PPD-MOMS    Helpful websites:    www.llli.org  www.Lela.[place]ara  www.Breastfeed[place].org    Initiation of breast pumping after discharge:     - Add 1 pumping session a day, additional to the infant's feedings, 2-3 weeks after delivery.     - Pump both breasts for 15 mins, immediately after a breast feeding session.    - Pumping first thing in the morning will provide greater output.    - If you chose to pump more than once a day, you should be consistent every day to prevent a breast infection.      - Pump using an electric pump over a hands free pump (electric pumps provided stronger stimulation to the nipples).    - Store the breast milk in 2-3 oz containers.     - Label containers with date and time.    - Always feed oldest milk first.           Other Discharge Instructions:           Post  Section Home Care Instructions   Pelvic rest for 6 weeks. No sex, tampons, baths or swimming. May shower.  No heavy lifting, nothing greater then 10-15 pounds.  No strenuous activity.  No driving for at least 2 weeks and you are no longer taking narcotics (norco).  Do not soak your incision/wound. Look at incision at least 2 times a day. If any signs/symptoms of infection, let OB office know (redness, swelling, pain, foul smelling odor or discharge).  Call MD for any questions or concerns, temp over 100.4, increased pain, increased bleeding, foul smelling odor/discharge from vagina/incision or signs of postpartum depression.      We hope you were pleased with your care at [facility].  We wish you the best outcome and overall experience with the delivery of your baby.  These instructions will help to minimize pain, limit the risk for an infection, and improve the likelihood of a successful recovery.    What to Expect:  Abdominal cramping after delivery especially if you are breastfeeding.   Vaginal bleeding for about 4-6 weeks that may be followed  by a yellow or white discharge for a few more weeks.  Your period will resume in approximately 6-8 weeks, unless you are breastfeeding.    If you are bottle feeding, you may notice breast engorgement in about 3 days.  Your breast may be sore and hard. Please wear a tight fitted bra or sports bra for 24-36 hours to help prevent your breast from producing milk, and use ice packs to relive any discomfort.  If you are breastfeeding, nipple dryness is very common the first few days.    Constipation is common after having a baby.  Please increase fluid and fiber in your diet.      Over-The-Counter Medication  Non-prescription anti-inflammatory medications can also help to ease the pain.  You may take Aleve, Tylenol or Ibuprofen   Colace or Metamucil for Constipation  Lanolin for dry nipples  Tucks, Witch Hazel and Epifoam for vaginal/perineum discomfort.   Drink a full glass of water with oral medication and take as directed.    Wound Care  The following instructions will promote proper healing and help to prevent infection  Please use soap and water over incision   Pat your incision dry and leave open to air if possible   If you have steri - strips, then please remove after 4-5 days from your surgery. You may remove after a shower to decrease discomfort.   Do not replace the Steri-Strips, if they come off.  If the tapes come off, leave them off and keep the incision clean.  You do not need to cover the incision or put any medications on the incision.    Bathing/Showers  You may resume showers  No baths, swimming, hot tubs until your post-partum visit    Home Medication  Resume your home medications as instructed    Diet  Resume your normal diet    Activity  Refrain from vaginal intercourse, vaginal suppositories, tampon use or douches until after your post-partum visit  No exercising for 4 weeks  You may climb stairs minimally for the 1st week.    Do not do heavy housework for at least 2-3 weeks    Return to Work or  School  You may return to work in 6-8 weeks  Contact your obstetrician’s office, if you need a medical release. ([phone])    Driving  Avoid driving for 1-2 weeks or sooner if not taking narcotics.    Follow-up Appointment with Your Obstetrician  Call your obstetrician’s office today for an appointment in four weeks.    The number is [phone].  Verify your appointment date, day, time, and location.  At your 1st post-partum office visit:  Your progress will be evaluated, findings reviewed, and any additional concerns and instructions will be discussed.    Questions or Concerns  Call your obstetrician’s office if you experience the following:  Severe pain not controlled by pain medication  Foul smelling vaginal discharge  Heavy bleeding  Shortness of breath  Fever  Redness, increased swelling or drainage from your incision  Crying and periods of sadness that prevents you from caring for yourself and your baby  Burning sensation during urination or inability to urinate  Swelling, redness or abnormal warmth to your leg/calf  Please call [phone]. If your call is made after office hours, a physician will be available to help you.  There is always a provider covering our patients.    Thank you for coming to [facility] to start your new family.  The nurses, obstetricians, and the anesthesiologists try very hard to make sure you receive the best care possible.  Your trust in them as well as us is greatly appreciated.    Thanks so much,   [facility]etric[facility]ne[facility]                 Imelda Baca MD  6/13/2025

## 2025-06-13 NOTE — LACTATION NOTE
06/13/25 0800   Evaluation Type   Evaluation Type Inpatient   Problems identified   Problems identified Knowledge deficit   Maternal history   Maternal history Caesarean section;Polycystic ovarian syndrome (PCOS)   Breastfeeding goal   Breastfeeding goal To maintain breast milk feeding per patient goal   Maternal Assessment   Prior breastfeeding experience (comment below) Primip   Breastfeeding Assistance LC assistance declined at this time  (Infant asleep)   Pain assessment   Location/Comment Denies   Treatment of Sore Nipples Expressed breast milk;Lanolin   Guidelines for use of:   Equipment Lanolin   Breast pump type Ameda Platinum   Current use of pump: occasional   Suggested use of pump Pump if infant is not latching to breast;Pump each time a supplement is offered   Other (comment) Plan for discharge today.  Reviewed benefits of skin to skin.  Pumping and supplementation guidelines reviewed.  All questions answered.     Informed of OP clinic and warm line.  Encouraged to verify with insurance prior to appointment. Support provided, encouraged to reach out with further questions.

## 2025-06-13 NOTE — PLAN OF CARE
Problem: Patient Centered Care  Goal: Patient preferences are identified and integrated in the patient's plan of care  Description: Interventions:  - What would you like us to know as we care for you? It's a boy!   - Provide timely, complete, and accurate information to patient/family  - Incorporate patient and family knowledge, values, beliefs, and cultural backgrounds into the planning and delivery of care  - Encourage patient/family to participate in care and decision-making at the level they choose  - Honor patient and family perspectives and choices  Outcome: Completed     Problem: POSTPARTUM  Goal: Long Term Goal:Experiences normal postpartum course  Description: INTERVENTIONS:  - Assess and monitor vital signs and lab values.  - Assess fundus and lochia.  - Provide ice/sitz baths for perineum discomfort.  - Monitor healing of incision/episiotomy/laceration, and assess for signs and symptoms of infection and hematoma.  - Assess bladder function and monitor for bladder distention.  - Provide/instruct/assist with pericare as needed.  - Provide VTE prophylaxis as needed.  - Monitor bowel function.  - Encourage ambulation and provide assistance as needed.  - Assess and monitor emotional status and provide social service/psych resources as needed.  - Utilize standard precautions and use personal protective equipment as indicated. Ensure aseptic care of all intravenous lines and invasive tubes/drains.  - Obtain immunization and exposure to communicable diseases history.  Outcome: Completed     Problem: POSTPARTUM  Goal: Optimize infant feeding at the breast  Description: INTERVENTIONS:  - Initiate breast feeding within first hour after birth.   - Monitor effectiveness of current breast feeding efforts.  - Assess support systems available to mother/family.  - Identify cultural beliefs/practices regarding lactation, letdown techniques, maternal food preferences.  - Assess mother's knowledge and previous experience  with breast feeding.  - Provide information as needed about early infant feeding cues (e.g., rooting, lip smacking, sucking fingers/hand) versus late cue of crying.  - Discuss/demonstrate breast feeding aids (e.g., infant sling, nursing footstool/pillows, and breast pumps).  - Encourage mother/other family members to express feelings/concerns, and actively listen.  - Educate father/SO about benefits of breast feeding and how to manage common lactation challenges.  - Recommend avoidance of specific medications or substances incompatible with breast feeding.  - Assess and monitor for signs of nipple pain/trauma.  - Instruct and provide assistance with proper latch.  - Review techniques for milk expression (breast pumping) and storage of breast milk. Provide pumping equipment/supplies, instructions and assistance, as needed.  - Encourage rooming-in and breast feeding on demand.  - Encourage skin-to-skin contact.  - Provide LC support as needed.  - Assess for and manage engorgement.  - Provide breast feeding education handouts and information on community breast feeding support.   Outcome: Completed     Problem: POSTPARTUM  Goal: Establishment of adequate milk supply with medication/procedure interruptions  Description: INTERVENTIONS:  - Review techniques for milk expression (breast pumping).   - Provide pumping equipment/supplies, instructions, and assistance until it is safe to breastfeed infant.  Outcome: Completed     Problem: POSTPARTUM  Goal: Appropriate maternal -  bonding  Description: INTERVENTIONS:  - Assess caregiver- interactions.  - Assess caregiver's emotional status and coping mechanisms.  - Encourage caregiver to participate in  daily care.  - Assess support systems available to mother/family.  - Provide /case management support as needed.  Outcome: Completed

## 2025-06-16 NOTE — PAYOR COMM NOTE
--------------  DISCHARGE REVIEW    Payor: [facility]  Subscriber #:  [id number]  Authorization Number: [id number]    Admit date: 25  Admit time:   7:13 AM  Discharge Date: 2025 11:50 AM     Admitting Physician: Adela Hampton DO  Attending Physician:  No att. providers found  Primary Care Physician: Pcp, None          Discharge Summary Notes        Discharge Summary signed by Imelda Baca MD at 2025  7:36 AM       Author: Imelda Baca MD Specialty: OBSTETRICS & GYNECOLOGY Author Type: Physician    Filed: 2025  7:36 AM Date of Service: 2025  7:35 AM Status: Signed    : Imelda Baca MD (Physician)           [facility]  part of 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 2024 by Mishel Daniels RN  2025 by Rosalina Owens MD    Overview Signed 2024  3:32 PM by Mishel Daniels RN   Negative foresight carrier screen-2024                       Hospital Course:     EDC: Estimated Date of Delivery: 25    Gestational Age: 40w1d    Date of Delivery: 6/10/2025  Time of Delivery: 3:33 AM    Antepartum complications: none    Delivered By: ADELA HAMPTON    Delivery Method: Caesarean Section    Delivery Procedures:   Surgical Procedures       Case IDs Date Procedure Surgeon Location Status    [id number] 6/10/25  SECTION Adela Hampton DO [facility]  L+D OR Comp            Baby: male      Apgars:  1 minute:   8                 5 minutes: 9                          10 minutes:      Feeding Method:The patient is currently breastfeeding.     Intrapartum Complications: Failure to Progress    Lacerations      Perineal lacerations: None      Vaginal laceration?: No      Cervical laceration?: No    Clitoral laceration?: No             Episiotomy: None    Placenta: Expressed    Postpartum complications: Anemia                  Discharge Plan:   Discharge Condition: Good    Discharge medications:  Current Discharge Medication List        New Orders    Details   ibuprofen 600 MG Oral Tab Take 1 tablet (600 mg total) by mouth every 6 (six) hours as needed for Pain (for pain).      docusate sodium 100 MG Oral Cap Take 1 capsule (100 mg total) by mouth 2 (two) times daily.      Acetaminophen 500 MG Oral Cap Take 2 capsules (1,000 mg total) by mouth every 6 (six) hours as needed for Pain.      ferrous sulfate 325 (65 FE) MG Oral Tab EC Take 1 tablet (325 mg total) by mouth daily with breakfast.           Home Meds - Unchanged    Details   Prenatal Vit-Fe Fumarate-FA (MULTI PRENATAL) 27-0.8 MG Oral Tab Take by mouth.                   Discharge Diet: As tolerated and General diet    Discharge Activity: Pelvic rest until cleared and As tolerated    Follow up:     Follow Up in the Office: 2 weeks for follow up     Follow-up Information       [facility]. Call.    Specialty: Pediatrics  Why: As needed  Contact information:  [address] [facility] [address] [phone]  Additional information:  Masks are optional for all patients and visitors, unless otherwise indicated.             Alondra Hampton, DO Follow up in 2 week(s).    Specialty: OBSTETRICS & GYNECOLOGY  Why: Incision check  Contact information:  [address] [facility]  [address] [place] [address] [phone]               Alondra Hampton, DO Follow up in 6 week(s).    Specialty:  OBSTETRICS & GYNECOLOGY  Why: Post Partum appointment  Contact information:  [address] [facility]  [address]  [facility] [address]  [phone]               [facility]. Call.    Specialty: Pediatrics  Why: As needed  Contact information:  [address] [place] [address]  [phone]  Additional information:  Your appointment is scheduled at [facility] - [address] 2nd floor [place], [place]   Reserved parking is available for the Breastfeeding Center in parking lot B off of [address]. Parking lot B is the parking lot closest to the Breastfeeding Center building.      What to Bring to your Appointment:      Referrals-   If your insurance requires a referral, you will need a referral from your OB physician for yourself and a referral from your baby’s physician for baby.      Fax referrals to the Breastfeeding Center at [phone]   Baby and All Supplies-   Adjust your feeding schedule on the day of your appointment so baby is hungry at the time of your appointment. This usually means to NOT feed for at least 2 - 3 hours prior to your appointment      Please bring ALL equipment you are using (such as a breast pump, pump parts, nipple shield, etc).      If you are supplementing your baby, bring enough expressed breastmilk or formula for a full feeding, and the method you are using for feeding your baby.      Masks are optional for all patients and visitors, unless otherwise indicated.                                 Other Discharge Instructions:         [facility] has great support for our families even after discharge.  We have virtual or in-person support groups.  Visit our website for the most up-to-date info for our many different support groups. [email or web address]/services/[email or web address]/resources/       Outpatient Lactation appointments:  Call [phone]- to schedule an appt.  Our office is located in the Maternal Fetal Medicine office  next to [employer] on the first floor.        Support Groups: Moms-to-be are also welcome! All mom's welcome even if its not your first.     MOM & BABY HOUR- Every new mom can use some support in the exciting but challenging adjustment to motherhood. Join us for Mom and Baby Hour where an experienced nurse and lactation consultant will guide conversations and answer questions and provide breastfeeding support.  Most weeks we will also have a guest speaker to present information on many different parenting topics. We welcome mothers and their infants (up to crawling), dad, grandmas, and others to join our group.    Meets most  10:00 - 11:30 a.m.  Masks are not required, but be considerate of others and do not attend if mom or baby have had any symptoms of illness within the previous 24 hours. Location [facility] [place] [address] [place] Go inside the front door and to the right to the “Community Education Room”.      Nurturing Mom- A support group for new and expectant moms looking for support with the transition to parenthood as well as those experiencing symptoms of  anxiety and/or depression.  Please contact @[email or web address] if you need directions or the link for the virtual meetings. Please contact [email or web address] if you plan to attend, but please be considerate of others and do not attend if mom or baby have had any symptoms of illness within the previous 24 hours.       Mom's Line: [phone]  A phone line dedicated for women (or anyone worried about a women) who may be experiencing signs or symptoms of postpartum depression, anxiety, or overwhelmed with new baby. Call - answered by live trained mental health professionals, free and confidential, emotional support, referrals, in any language.    La Leche League for breast feeding and parent support, Website: IIIi.org  and for the [place] group and other groups visit  [email or web address]    Facebook groups-  for more support when home- Babies & Mommies of [facility] --- you can find mom-to-mom advice and the list of speaker topics for cradle talk program.  Telephone Support  Postpartum depression [place] of [place] (www.ppdil.org)  -Free information and support for pregnant and postpartum women with symptoms of depression, anxiety  -Mom-to-mom support from volunteers who have been through depression    Telephone support: ([phone] [phone]  Urgent support:(698)-933-2541 (answered )  Email support: support@ppdil.org    Postpartum Support International (www.postpartum.net)  -Free phone conversation with trained volunteers   (881) 342-9002; after the prompt enter 39158#    National Postpartum Depression Hotline  (871)-PPD-MOMS    Helpful websites:    www.[employer].Reqlut  www.[employer]  www.Breastfeed[place].org    Initiation of breast pumping after discharge:     - Add 1 pumping session a day, additional to the infant's feedings, 2-3 weeks after delivery.     - Pump both breasts for 15 mins, immediately after a breast feeding session.    - Pumping first thing in the morning will provide greater output.    - If you chose to pump more than once a day, you should be consistent every day to prevent a breast infection.      - Pump using an electric pump over a hands free pump (electric pumps provided stronger stimulation to the nipples).    - Store the breast milk in 2-3 oz containers.     - Label containers with date and time.    - Always feed oldest milk first.           Other Discharge Instructions:           Post  Section Home Care Instructions   Pelvic rest for 6 weeks. No sex, tampons, baths or swimming. May shower.  No heavy lifting, nothing greater then 10-15 pounds.  No strenuous activity.  No driving for at least 2 weeks and you are no longer taking narcotics (norco).  Do not soak your incision/wound. Look at incision at least 2 times a  day. If any signs/symptoms of infection, let OB office know (redness, swelling, pain, foul smelling odor or discharge).  Call MD for any questions or concerns, temp over 100.4, increased pain, increased bleeding, foul smelling odor/discharge from vagina/incision or signs of postpartum depression.      We hope you were pleased with your care at [facility].  We wish you the best outcome and overall experience with the delivery of your baby.  These instructions will help to minimize pain, limit the risk for an infection, and improve the likelihood of a successful recovery.    What to Expect:  Abdominal cramping after delivery especially if you are breastfeeding.   Vaginal bleeding for about 4-6 weeks that may be followed by a yellow or white discharge for a few more weeks.  Your period will resume in approximately 6-8 weeks, unless you are breastfeeding.    If you are bottle feeding, you may notice breast engorgement in about 3 days.  Your breast may be sore and hard. Please wear a tight fitted bra or sports bra for 24-36 hours to help prevent your breast from producing milk, and use ice packs to relive any discomfort.  If you are breastfeeding, nipple dryness is very common the first few days.    Constipation is common after having a baby.  Please increase fluid and fiber in your diet.      Over-The-Counter Medication  Non-prescription anti-inflammatory medications can also help to ease the pain.  You may take Aleve, Tylenol or Ibuprofen   Colace or Metamucil for Constipation  Lanolin for dry nipples  Tucks, Witch Hazel and Epifoam for vaginal/perineum discomfort.   Drink a full glass of water with oral medication and take as directed.    Wound Care  The following instructions will promote proper healing and help to prevent infection  Please use soap and water over incision   Pat your incision dry and leave open to air if possible   If you have steri - strips, then please remove after 4-5 days from  your surgery. You may remove after a shower to decrease discomfort.   Do not replace the Steri-Strips, if they come off.  If the tapes come off, leave them off and keep the incision clean.  You do not need to cover the incision or put any medications on the incision.    Bathing/Showers  You may resume showers  No baths, swimming, hot tubs until your post-partum visit    Home Medication  Resume your home medications as instructed    Diet  Resume your normal diet    Activity  Refrain from vaginal intercourse, vaginal suppositories, tampon use or douches until after your post-partum visit  No exercising for 4 weeks  You may climb stairs minimally for the 1st week.    Do not do heavy housework for at least 2-3 weeks    Return to Work or School  You may return to work in 6-8 weeks  Contact your obstetrician’s office, if you need a medical release. ([phone])    Driving  Avoid driving for 1-2 weeks or sooner if not taking narcotics.    Follow-up Appointment with Your Obstetrician  Call your obstetrician’s office today for an appointment in four weeks.    The number is [phone].  Verify your appointment date, day, time, and location.  At your 1st post-partum office visit:  Your progress will be evaluated, findings reviewed, and any additional concerns and instructions will be discussed.    Questions or Concerns  Call your obstetrician’s office if you experience the following:  Severe pain not controlled by pain medication  Foul smelling vaginal discharge  Heavy bleeding  Shortness of breath  Fever  Redness, increased swelling or drainage from your incision  Crying and periods of sadness that prevents you from caring for yourself and your baby  Burning sensation during urination or inability to urinate  Swelling, redness or abnormal warmth to your leg/calf  Please call [phone]. If your call is made after office hours, a physician will be available to help you.  There is always a provider covering our  patients.    Thank you for coming to [facility] to start your new family.  The nurses, obstetricians, and the anesthesiologists try very hard to make sure you receive the best care possible.  Your trust in them as well as us is greatly appreciated.    Thanks so much,   The [facility] Obstetrics and Gynecology                 Imelda Baca MD  6/13/2025    Electronically signed by Imelda Baca MD on 6/13/2025  7:36 AM         REVIEWER COMMENTS

## 2025-06-19 ENCOUNTER — PATIENT MESSAGE (OUTPATIENT)
Dept: OBGYN CLINIC | Facility: CLINIC | Age: 35
End: 2025-06-19

## 2025-06-26 ENCOUNTER — TELEPHONE (OUTPATIENT)
Dept: OBGYN UNIT | Facility: HOSPITAL | Age: 35
End: 2025-06-26

## 2025-07-02 ENCOUNTER — POSTPARTUM (OUTPATIENT)
Dept: OBGYN CLINIC | Facility: CLINIC | Age: 35
End: 2025-07-02
Payer: MEDICAID

## 2025-07-02 VITALS — WEIGHT: 176 LBS | SYSTOLIC BLOOD PRESSURE: 102 MMHG | BODY MASS INDEX: 33 KG/M2 | DIASTOLIC BLOOD PRESSURE: 78 MMHG

## 2025-07-02 DIAGNOSIS — Z98.891 S/P CESAREAN SECTION: Primary | ICD-10-CM

## 2025-07-02 RX ORDER — FAMOTIDINE 20 MG/1
20 TABLET, FILM COATED ORAL 2 TIMES DAILY
COMMUNITY
Start: 2025-06-15

## 2025-07-02 RX ORDER — AMLODIPINE BESYLATE 5 MG/1
5 TABLET ORAL DAILY
COMMUNITY
Start: 2025-06-15

## 2025-07-02 NOTE — PROGRESS NOTES
POSTPARTUM VISIT     HPI:   Alisson Gonzalez is a 34 year old  female presenting for postpartum visit.     She is s/p pLTCS at 40w1d for arrest of descent after augmentation of labor. Her immediate post-operative course was unremarkable and she was discharged on POD#3. The following day she was evaluated in the ED due to SOB/palpitations. Workup was negative for cardiomyopathy but she was started on amlodipine 5mg daily by cardiology. It does not appear she was formally dx with PI. Ultimately she attributes her presenting symptoms to overwhelm.  Today she reports:     Pain: improving, well-controlled with prescribed medications  Bleeding: slowing  Infant feeding: breast milk  Mood: no concerns    Medications (Active prior to today's visit):  Current Medications[1]    Allergies:  Allergies[2]    Wt 176 lb (79.8 kg)   LMP 2024 (Exact Date)   Breastfeeding Yes   BMI 33.25 kg/m²     PHYSICAL EXAM:   GENERAL: Well developed, well nourished, in no apparent distress  ABDOMEN: Soft, non distended, non tender, no masses, incision c/d/I   EXTREMITIES: nontender without edema      ASSESSMENT/PLAN:       #Postpartum visit  -Pain: well-controlled  -Incision: healing well  -Mood: EPDS 5  -Breastfeeding: no concerns  -Contraception: to be discussed at 6wk postpartum visit     #Elevated BP w/o dx  -discussed daily home BP monitoring  -may continue amlodipine for now, will wean pending home BP assessment   -low suspicion for preE    Follow up for 6wk postpartum visit      Alondra Hampton DO                 [1]   Current Outpatient Medications   Medication Sig Dispense Refill    ibuprofen 600 MG Oral Tab Take 1 tablet (600 mg total) by mouth every 6 (six) hours as needed for Pain (for pain). 30 tablet 0    docusate sodium 100 MG Oral Cap Take 1 capsule (100 mg total) by mouth 2 (two) times daily. 30 capsule 0    ferrous sulfate 325 (65 FE) MG Oral Tab EC Take 1 tablet (325 mg total) by mouth daily with breakfast. 30  tablet 0    gabapentin 300 MG Oral Cap Take 1 capsule (300 mg total) by mouth every 8 (eight) hours as needed (pain). 30 capsule 11    Prenatal Vit-Fe Fumarate-FA (MULTI PRENATAL) 27-0.8 MG Oral Tab Take by mouth.     [2] No Known Allergies

## 2025-07-03 ENCOUNTER — TELEPHONE (OUTPATIENT)
Dept: OBGYN CLINIC | Facility: CLINIC | Age: 35
End: 2025-07-03

## 2025-07-03 NOTE — TELEPHONE ENCOUNTER
Left message to call back        ----- Message from Alondra Hampton sent at 2025  2:11 PM CDT -----  This pt is 3wk PP from a . I saw her today for incision check. The day after we discharged her she went to another hospital for SOB/palpitations, they ultimately transferred her to [place] where she had a big cardiac workup, which was essentially negative. The cardiologists there started her on amlodipine. I'm having her check her BP at home so we can decide if she needs this. Can you please reach out to her in a week and go over her BP logs?

## 2025-07-11 NOTE — TELEPHONE ENCOUNTER
Called pt and provided BP LOGS    Bp 110/76, 105/72, 107/73    Informed will route to DR. Hampton.    Alondra Hampton, DO to Me (Selected Message)        7/11/25 12:15 PM  Please let her know she can stop her BP meds.    Left message to stop BP meds.

## 2025-07-11 NOTE — TELEPHONE ENCOUNTER
Incoming call from patient requesting to speak with KATHERINE Florentino.     Please assist, thank you.

## 2025-07-18 NOTE — PROGRESS NOTES
POSTPARTUM VISIT     HPI:   Alisson Gonzalez is a 34 year old  female presenting for postpartum visit.     She is s/p pLTCS at 40w1d for arrest of descent after augmentation of labor. Her immediate post-operative course was unremarkable and she was discharged on POD#3. The following day she was evaluated in the ED due to SOB/palpitations. Workup was negative for cardiomyopathy but she was started on amlodipine 5mg daily by cardiology. It does not appear she was formally dx with PIH. Ultimately she attributes her presenting symptoms to overwhelm.  Today she reports:     Pain: improved, rarely needs analgesics   Bleeding: slowing  Infant feeding: breast milk  Mood: no concerns    Medications (Active prior to today's visit):  Current Medications[1]    Allergies:  Allergies[2]    LMP 2024 (Exact Date)     PHYSICAL EXAM:   GENERAL: Well developed, well nourished, in no apparent distress  ABDOMEN: Soft, non distended, non tender, no masses, incision c/d/I   EXTREMITIES: nontender without edema      ASSESSMENT/PLAN:       #Postpartum visit  -Pain: well-controlled  -Incision: healing well  -Mood: EPDS 2  -Breastfeeding: no concerns  -Contraception: POPs    #Elevated BP w/o dx  -s/p normal home BP monitoring after discontinuing amlodipine, very low suspicion for PIH     Follow up for annual exam    Alondra Hampton DO                 [1]   Current Outpatient Medications   Medication Sig Dispense Refill    amLODIPine 5 MG Oral Tab Take 1 tablet (5 mg total) by mouth daily.      famotidine 20 MG Oral Tab Take 1 tablet (20 mg total) by mouth 2 (two) times daily.      Blood Pressure Monitor Does not apply Device Check your blood pressure once a day and record the readings. Call the office if you persistently have readings of 140 or higher systolic (top number)  or higher diastolic (bottom number.) 1 each 0    ibuprofen 600 MG Oral Tab Take 1 tablet (600 mg total) by mouth every 6 (six) hours as needed for Pain  (for pain). (Patient not taking: Reported on 7/2/2025) 30 tablet 0    docusate sodium 100 MG Oral Cap Take 1 capsule (100 mg total) by mouth 2 (two) times daily. 30 capsule 0    ferrous sulfate 325 (65 FE) MG Oral Tab EC Take 1 tablet (325 mg total) by mouth daily with breakfast. 30 tablet 0    gabapentin 300 MG Oral Cap Take 1 capsule (300 mg total) by mouth every 8 (eight) hours as needed (pain). 30 capsule 11    Prenatal Vit-Fe Fumarate-FA (MULTI PRENATAL) 27-0.8 MG Oral Tab Take by mouth.     [2] No Known Allergies

## 2025-07-22 ENCOUNTER — POSTPARTUM (OUTPATIENT)
Dept: OBGYN CLINIC | Facility: CLINIC | Age: 35
End: 2025-07-22
Payer: MEDICAID

## 2025-07-22 VITALS
WEIGHT: 173.63 LBS | DIASTOLIC BLOOD PRESSURE: 66 MMHG | SYSTOLIC BLOOD PRESSURE: 108 MMHG | HEIGHT: 61 IN | BODY MASS INDEX: 32.78 KG/M2

## 2025-07-22 RX ORDER — ACETAMINOPHEN AND CODEINE PHOSPHATE 120; 12 MG/5ML; MG/5ML
0.35 SOLUTION ORAL DAILY
Qty: 28 TABLET | Refills: 12 | Status: SHIPPED | OUTPATIENT
Start: 2025-07-22 | End: 2026-07-22

## (undated) NOTE — LETTER
[facility]  Administration of Anesthesia  Alisson LEÓN agree to be cared for by a physician anesthesiologist alone and/or with a nurse anesthetist, who is specially trained to monitor me and give me medicine to put me to sleep or keep me comfortable during my procedure    I understand that my anesthesiologist and/or anesthetist is not an employee or agent of [facility] or [facility]. He or she works for [facility]    As the patient asking for anesthesia services, I agree to:  Allow the anesthesiologist (anesthesia doctor) to give me medicine and do additional procedures as necessary. Some examples are: Starting or using an “IV” to give me medicine, fluids or blood during my procedure, and having a breathing tube placed to help me breathe when I’m asleep (intubation). In the event that my heart stops working properly, I understand that my anesthesiologist will make every effort to sustain my life, unless otherwise directed by [facility] Do Not Resuscitate documents.  Tell my anesthesia doctor before my procedure:  If I am pregnant.  The last time that I ate or drank.  iii. All of the medicines I take (including prescriptions, herbal supplements, and pills I can buy without a prescription (including street drugs/illegal medications). Failure to inform my anesthesiologist about these medicines may increase my risk of anesthetic complications.  iv.If I am allergic to anything or have had a reaction to anesthesia before.  I understand how the anesthesia medicine will help me (benefits).  I understand that with any type of anesthesia medicine there are risks:  The most common risks are: nausea, vomiting, sore throat, muscle soreness, damage to my eyes, mouth, or teeth (from breathing tube placement).  Rare risks include: remembering what happened during my procedure, allergic reactions to medications, injury to my airway, heart, lungs, vision, nerves, or  muscles and in extremely rare instances death.  My doctor has explained to me other choices available to me for my care (alternatives).  Pregnant Patients (“epidural”):  I understand that the risks of having an epidural (medicine given into my back to help control pain during labor), include itching, low blood pressure, difficulty urinating, headache or slowing of the baby’s heart. Very rare risks include infection, bleeding, seizure, irregular heart rhythms and nerve injury.  Regional Anesthesia (“spinal”, “epidural”, & “nerve blocks”):  I understand that rare but potential complications include headache, bleeding, infection, seizure, irregular heart rhythms, and nerve injury.    _____________________________________________________________________________  Patient (or Representative) Signature/Relationship to Patient  Date   Time    _____________________________________________________________________________   Name (if used)    Language/Organization   Time    _____________________________________________________________________________  Nurse Anesthetist Signature     Date   Time  _____________________________________________________________________________  Anesthesiologist Signature     Date   Time  I have discussed the procedure and information above with the patient (or patient’s representative) and answered their questions. The patient or their representative has agreed to have anesthesia services.    _____________________________________________________________________________  Witness        Date   Time  I have verified that the signature is that of the patient or patient’s representative, and that it was signed before the procedure  Patient Name: Alisson Gonzalez     : 1990                 Printed: 2025 at 7:14 AM    Medical Record #: [id number]                                            Page 1 of 1  ----------ANESTHESIA CONSENT----------

## (undated) NOTE — MR AVS SNAPSHOT
After Visit Summary   11/4/2020    Aline Vikram    MRN: [id number]           Visit Information     Date & Time  11/4/2020  5:45 PM Provider  Kevin Ba, [address], [facility] Dept.  Phone  33 If you receive a survey from [employer], please take a few minutes to complete it and provide feedback. We strive to deliver the best patient experience and are looking for ways to make improvements. Your feedback will help us do so.  For more information EMERGENCY ROOM Life-threatening emergencies needing immediate intervention at a hospital emergency room.  Average cost  $2,300*   *Cost varies based on your insurance coverage  For more information about hours, locations or appointment options available at

## (undated) NOTE — MR AVS SNAPSHOT
After Visit Summary   11/16/2021    Bryan Engel   MRN: [id number]           Visit Information     Date & Time  11/16/2021  3:00 PM Provider  Quita Rawls, [address] [place] [address], [facility] Dept.  Phone  3 looking for ways to make improvements. Your feedback will help us do so. For more information on [employer], please visit [email or web address]         No text in SmartText       No text in SmartText